# Patient Record
Sex: FEMALE | Race: WHITE | NOT HISPANIC OR LATINO | Employment: OTHER | URBAN - METROPOLITAN AREA
[De-identification: names, ages, dates, MRNs, and addresses within clinical notes are randomized per-mention and may not be internally consistent; named-entity substitution may affect disease eponyms.]

---

## 2017-02-10 ENCOUNTER — HOSPITAL ENCOUNTER (OUTPATIENT)
Dept: LAB | Facility: MEDICAL CENTER | Age: 57
End: 2017-02-10
Attending: FAMILY MEDICINE
Payer: COMMERCIAL

## 2017-02-10 LAB
T3 SERPL-MCNC: 79.6 NG/DL (ref 60–181)
T4 SERPL-MCNC: 7.8 UG/DL (ref 4–12)
TSH SERPL DL<=0.005 MIU/L-ACNC: 0.81 UIU/ML (ref 0.3–3.7)

## 2017-02-10 PROCEDURE — 36415 COLL VENOUS BLD VENIPUNCTURE: CPT

## 2017-02-10 PROCEDURE — 82542 COL CHROMOTOGRAPHY QUAL/QUAN: CPT

## 2017-02-10 PROCEDURE — 84480 ASSAY TRIIODOTHYRONINE (T3): CPT

## 2017-02-10 PROCEDURE — 84436 ASSAY OF TOTAL THYROXINE: CPT

## 2017-02-10 PROCEDURE — 84443 ASSAY THYROID STIM HORMONE: CPT

## 2017-02-17 LAB — MISCELLANEOUS LAB RESULT MISCLAB: ABNORMAL

## 2017-06-02 ENCOUNTER — OFFICE VISIT (OUTPATIENT)
Dept: URGENT CARE | Facility: CLINIC | Age: 57
End: 2017-06-02
Payer: COMMERCIAL

## 2017-06-02 ENCOUNTER — APPOINTMENT (OUTPATIENT)
Dept: RADIOLOGY | Facility: IMAGING CENTER | Age: 57
End: 2017-06-02
Attending: NURSE PRACTITIONER
Payer: COMMERCIAL

## 2017-06-02 VITALS
WEIGHT: 166 LBS | BODY MASS INDEX: 24.87 KG/M2 | TEMPERATURE: 99.8 F | RESPIRATION RATE: 18 BRPM | OXYGEN SATURATION: 95 % | DIASTOLIC BLOOD PRESSURE: 70 MMHG | SYSTOLIC BLOOD PRESSURE: 106 MMHG | HEART RATE: 102 BPM

## 2017-06-02 DIAGNOSIS — R50.9 FEVER, UNSPECIFIED FEVER CAUSE: ICD-10-CM

## 2017-06-02 DIAGNOSIS — J40 BRONCHITIS: ICD-10-CM

## 2017-06-02 DIAGNOSIS — R06.02 SOB (SHORTNESS OF BREATH): ICD-10-CM

## 2017-06-02 DIAGNOSIS — R05.9 COUGH: ICD-10-CM

## 2017-06-02 PROCEDURE — 71020 DX-CHEST-2 VIEWS: CPT | Mod: TC | Performed by: NURSE PRACTITIONER

## 2017-06-02 PROCEDURE — 99214 OFFICE O/P EST MOD 30 MIN: CPT | Performed by: NURSE PRACTITIONER

## 2017-06-02 RX ORDER — ALBUTEROL SULFATE 90 UG/1
2 AEROSOL, METERED RESPIRATORY (INHALATION) EVERY 6 HOURS PRN
Qty: 8.5 G | Refills: 0 | Status: SHIPPED | OUTPATIENT
Start: 2017-06-02 | End: 2019-12-16

## 2017-06-02 RX ORDER — AZITHROMYCIN 250 MG/1
TABLET, FILM COATED ORAL
Qty: 6 TAB | Refills: 0 | Status: SHIPPED | OUTPATIENT
Start: 2017-06-02 | End: 2018-03-07

## 2017-06-02 RX ORDER — CODEINE PHOSPHATE AND GUAIFENESIN 10; 100 MG/5ML; MG/5ML
5 SOLUTION ORAL EVERY 6 HOURS PRN
Qty: 120 ML | Refills: 0 | Status: SHIPPED | OUTPATIENT
Start: 2017-06-02 | End: 2018-03-07

## 2017-06-02 ASSESSMENT — ENCOUNTER SYMPTOMS
HEADACHES: 1
CARDIOVASCULAR NEGATIVE: 1
COUGH: 1
SPUTUM PRODUCTION: 1
PSYCHIATRIC NEGATIVE: 1
EYES NEGATIVE: 1
SHORTNESS OF BREATH: 1
FEVER: 1
CHILLS: 1
GASTROINTESTINAL NEGATIVE: 1
MYALGIAS: 1

## 2017-06-02 NOTE — PROGRESS NOTES
Subjective:      Laura Glass is a 56 y.o. female who presents with Cough    Past Medical History   Diagnosis Date   • Dyslipidemia 7/16/2012     Social History     Social History   • Marital Status:      Spouse Name: N/A   • Number of Children: N/A   • Years of Education: N/A     Occupational History   • Not on file.     Social History Main Topics   • Smoking status: Never Smoker    • Smokeless tobacco: Never Used   • Alcohol Use: Yes      Comment: occ   • Drug Use: Not on file   • Sexual Activity: Not on file     Other Topics Concern   • Not on file     Social History Narrative   • No narrative on file     Family History   Problem Relation Age of Onset   • Diabetes Mother    • Other Father      Allergies: Review of patient's allergies indicates no known allergies.    All other systems reviewed and are negative     Patient presetns with complaint of malaise, fever, SOB and productive cough x 2 days. Symptoms have worsened significantly in the last 24 hours. She has been spending a great deal of time at the hospital with her mother who has pneumonia. Patient is a non-smoker and denies history of chronic lung disease.           Cough  Associated symptoms include chills, a fever, headaches, myalgias and shortness of breath.       Review of Systems   Constitutional: Positive for fever, chills and malaise/fatigue.   HENT: Positive for congestion.    Eyes: Negative.    Respiratory: Positive for cough, sputum production and shortness of breath.    Cardiovascular: Negative.    Gastrointestinal: Negative.    Genitourinary: Negative.    Musculoskeletal: Positive for myalgias.   Skin: Negative.    Neurological: Positive for headaches.   Psychiatric/Behavioral: Negative.      All other systems reviewed and are negative      Objective:     /70 mmHg  Pulse 102  Temp(Src) 37.7 °C (99.8 °F)  Resp 18  Wt 75.297 kg (166 lb)  SpO2 95%     Physical Exam   Constitutional: She is oriented to person, place, and time.  She appears well-developed and well-nourished. No distress.   HENT:   Head: Normocephalic.   Right Ear: External ear normal.   Left Ear: External ear normal.   Nose: Nose normal.   Mouth/Throat: Oropharynx is clear and moist. No oropharyngeal exudate.   Eyes: Conjunctivae and EOM are normal. Pupils are equal, round, and reactive to light. Right eye exhibits no discharge. Left eye exhibits no discharge.   Neck: Normal range of motion. Neck supple.   Cardiovascular: Regular rhythm and normal heart sounds.       Pulmonary/Chest: Effort normal.   Breath sounds harsh   Musculoskeletal: Normal range of motion.   Neurological: She is alert and oriented to person, place, and time.   Skin: Skin is warm and dry. She is not diaphoretic.   Psychiatric: She has a normal mood and affect. Her behavior is normal.   Vitals reviewed.    XR chest:          HISTORY/REASON FOR EXAM:  Asymmetric density along right heart border on chest x-ray 7/10/2.      TECHNIQUE/EXAM DESCRIPTION:  CT scan of the chest with contrast, 7/24/2012 1:36 PM.    Thin-section helical images were obtained from the lung apices through the adrenal glands following the bolus administration of  75 mL of Omnipaque 350 nonionic contrast.    COMPARISON:  As above.    FINDINGS:  There is an ovoid uniformly fat density structure in the right anterior cardiophrenic angle with a thin soft tissue capsule, measuring overall about 4.8-cm in size.  This would account for the density seen on the recent chest x-ray.  The mediastinum and ross are otherwise unremarkable.  No pleural or pericardial effusion.  The lung parenchyma is normal.  The visualized upper abdominal structures are unremarkable except to note a 5-mm low attenuation lesion in the dome of the right hepatic lobe, consistent with a cyst.         Impression        1. Circumscribed fat containing structure in the right anterior cardiophrenic angle, accounting for the density in that location on recent chest  radiograph.  This is consistent with a prominent epicardial fat pad or lipoma.  2. No other significant findings.               Assessment/Plan:   Bronchitis  Cough   -zithroamx   -albuterol    -cheratussin PRN cough; clearly stated driving or alcohol with this medication, checked patient's  and find no evidence of narcotic misuse.   -ER precautions for respiratory distress, uncontrolled fever >100.5, weakness, lethargy.    -Follow up if symptoms persist or worsen  There are no diagnoses linked to this encounter.

## 2017-06-02 NOTE — MR AVS SNAPSHOT
Laura Glass   2017 8:15 AM   Office Visit   MRN: [id number]    Department:  [facility]   Dept Phone:  [phone]    Description:  Female : 1960   Provider:  Cathey J Hamman, A.P.N.           Reason for Visit     Cough productive cough, clear phlem, chills x 3 days      Allergies as of 2017     No Known Allergies      You were diagnosed with     Cough   [786.2.ICD-9-CM]       Fever, unspecified fever cause   [2749512]       SOB (shortness of breath)   [763147]       Bronchitis   [179107]         Vital Signs     Blood Pressure Pulse Temperature Respirations Weight Oxygen Saturation    106/70 mmHg 102 37.7 °C (99.8 °F) 18 75.297 kg (166 lb) 95%    Smoking Status                   Never Smoker            Basic Information     Date Of Birth Sex Race Ethnicity Preferred Language    1960 Female White Non- English      Problem List              ICD-10-CM Priority Class Noted - Resolved    Back pain M54.9   2012 - Present    Hot flashes R23.2   2013 - Present    Impaired fasting glucose R73.01   3/20/2015 - Present    Hyperlipidemia E78.5   2015 - Present      Health Maintenance        Date Due Completion Dates    IMM DTaP/Tdap/Td Vaccine (1 - Tdap) 9/10/1978 ---    PAP SMEAR 2016 (Prv Comp), 2011 (Done)    Override on 2013: Previously completed    Override on 2011: Done (clancy)    MAMMOGRAM 2016, 2014, 2012, 2006, 10/25/2005    COLONOSCOPY 3/10/2024 3/10/2016            Current Immunizations     Influenza TIV (IM) 2013      Below and/or attached are the medications your provider expects you to take. Review all of your home medications and newly ordered medications with your provider and/or pharmacist. Follow medication instructions as directed by your provider and/or pharmacist. Please keep your medication list with you and share with your provider. Update the information when medications  are discontinued, doses are changed, or new medications (including over-the-counter products) are added; and carry medication information at all times in the event of emergency situations     Allergies:  No Known Allergies          Medications  Valid as of: June 02, 2017 -  9:08 AM    Generic Name Brand Name Tablet Size Instructions for use    Albuterol Sulfate (Aero Soln) albuterol 108 (90 BASE) MCG/ACT Inhale 2 Puffs by mouth every 6 hours as needed for Shortness of Breath.        Albuterol Sulfate (Aero Soln) albuterol 108 (90 BASE) MCG/ACT Inhale 2 Puffs by mouth every 6 hours as needed for Shortness of Breath.        Azithromycin (Tab) ZITHROMAX 250 MG Take 2 pills by mouth on day one, take 1 pill by mouth on days 2-5        Benzonatate (Cap) TESSALON 100 MG Take 1 Cap by mouth 3 times a day as needed for Cough. DO NOT CHEW CAPSULE        Guaifenesin-Codeine (Solution) ROBITUSSIN -10 mg/5mL Take 5 mL by mouth every 6 hours as needed.        Pravastatin Sodium (Tab) PRAVACHOL 40 MG Take 1 Tab by mouth every day. TAKE 1 TAB BY MOUTH EVERY DAY.        Thyroid   Take  by mouth.        .                 Medicines prescribed today were sent to:     [facility][id number] [address]    [address] [facility] [address]    Phone: [phone] Fax: [phone]    Open 24 Hours?: No      Medication refill instructions:       If your prescription bottle indicates you have medication refills left, it is not necessary to call your provider’s office. Please contact your pharmacy and they will refill your medication.    If your prescription bottle indicates you do not have any refills left, you may request refills at any time through one of the following ways: The online AlertMe system (except Urgent Care), by calling your provider’s office, or by asking your pharmacy to contact your provider’s office with a refill request. Medication refills are processed only during regular business hours and may not  be available until the next business day. Your provider may request additional information or to have a follow-up visit with you prior to refilling your medication.   *Please Note: Medication refills are assigned a new Rx number when refilled electronically. Your pharmacy may indicate that no refills were authorized even though a new prescription for the same medication is available at the pharmacy. Please request the medicine by name with the pharmacy before contacting your provider for a refill.        Your To Do List     Future Labs/Procedures Complete By Expires    DX-CHEST-2 VIEWS  As directed 6/2/2018         [facility] Access Code: Activation code not generated  Current [facility] Status: Active

## 2017-07-21 ENCOUNTER — HOSPITAL ENCOUNTER (OUTPATIENT)
Dept: RADIOLOGY | Facility: MEDICAL CENTER | Age: 57
End: 2017-07-21
Attending: FAMILY MEDICINE
Payer: COMMERCIAL

## 2017-07-21 DIAGNOSIS — Z12.31 SCREENING MAMMOGRAM, ENCOUNTER FOR: ICD-10-CM

## 2017-07-21 PROCEDURE — 77063 BREAST TOMOSYNTHESIS BI: CPT

## 2017-08-16 ENCOUNTER — HOSPITAL ENCOUNTER (OUTPATIENT)
Dept: LAB | Facility: MEDICAL CENTER | Age: 57
End: 2017-08-16
Attending: FAMILY MEDICINE
Payer: COMMERCIAL

## 2017-08-16 LAB
25(OH)D3 SERPL-MCNC: 41 NG/ML (ref 30–100)
ALBUMIN SERPL BCP-MCNC: 4 G/DL (ref 3.2–4.9)
ALBUMIN/GLOB SERPL: 1.4 G/DL
ALP SERPL-CCNC: 55 U/L (ref 30–99)
ALT SERPL-CCNC: 22 U/L (ref 2–50)
ANION GAP SERPL CALC-SCNC: 8 MMOL/L (ref 0–11.9)
AST SERPL-CCNC: 19 U/L (ref 12–45)
BASOPHILS # BLD AUTO: 0.6 % (ref 0–1.8)
BASOPHILS # BLD: 0.03 K/UL (ref 0–0.12)
BILIRUB SERPL-MCNC: 0.5 MG/DL (ref 0.1–1.5)
BUN SERPL-MCNC: 17 MG/DL (ref 8–22)
CALCIUM SERPL-MCNC: 9.4 MG/DL (ref 8.5–10.5)
CHLORIDE SERPL-SCNC: 106 MMOL/L (ref 96–112)
CHOLEST SERPL-MCNC: 243 MG/DL (ref 100–199)
CK SERPL-CCNC: 138 U/L (ref 0–154)
CO2 SERPL-SCNC: 25 MMOL/L (ref 20–33)
CREAT SERPL-MCNC: 0.68 MG/DL (ref 0.5–1.4)
EOSINOPHIL # BLD AUTO: 0.14 K/UL (ref 0–0.51)
EOSINOPHIL NFR BLD: 2.8 % (ref 0–6.9)
ERYTHROCYTE [DISTWIDTH] IN BLOOD BY AUTOMATED COUNT: 45.7 FL (ref 35.9–50)
GFR SERPL CREATININE-BSD FRML MDRD: >60 ML/MIN/1.73 M 2
GLOBULIN SER CALC-MCNC: 2.9 G/DL (ref 1.9–3.5)
GLUCOSE SERPL-MCNC: 97 MG/DL (ref 65–99)
HCT VFR BLD AUTO: 43 % (ref 37–47)
HDLC SERPL-MCNC: 64 MG/DL
HGB BLD-MCNC: 14.2 G/DL (ref 12–16)
IMM GRANULOCYTES # BLD AUTO: 0.01 K/UL (ref 0–0.11)
IMM GRANULOCYTES NFR BLD AUTO: 0.2 % (ref 0–0.9)
LDLC SERPL CALC-MCNC: 150 MG/DL
LYMPHOCYTES # BLD AUTO: 2.55 K/UL (ref 1–4.8)
LYMPHOCYTES NFR BLD: 50.3 % (ref 22–41)
MCH RBC QN AUTO: 30.2 PG (ref 27–33)
MCHC RBC AUTO-ENTMCNC: 33 G/DL (ref 33.6–35)
MCV RBC AUTO: 91.5 FL (ref 81.4–97.8)
MONOCYTES # BLD AUTO: 0.43 K/UL (ref 0–0.85)
MONOCYTES NFR BLD AUTO: 8.5 % (ref 0–13.4)
NEUTROPHILS # BLD AUTO: 1.91 K/UL (ref 2–7.15)
NEUTROPHILS NFR BLD: 37.6 % (ref 44–72)
NRBC # BLD AUTO: 0 K/UL
NRBC BLD AUTO-RTO: 0 /100 WBC
PLATELET # BLD AUTO: 248 K/UL (ref 164–446)
PMV BLD AUTO: 10 FL (ref 9–12.9)
POTASSIUM SERPL-SCNC: 3.9 MMOL/L (ref 3.6–5.5)
PROT SERPL-MCNC: 6.9 G/DL (ref 6–8.2)
RBC # BLD AUTO: 4.7 M/UL (ref 4.2–5.4)
SODIUM SERPL-SCNC: 139 MMOL/L (ref 135–145)
T4 SERPL-MCNC: 7.9 UG/DL (ref 4–12)
TRIGL SERPL-MCNC: 146 MG/DL (ref 0–149)
TSH SERPL DL<=0.005 MIU/L-ACNC: 0.03 UIU/ML (ref 0.3–3.7)
WBC # BLD AUTO: 5.1 K/UL (ref 4.8–10.8)

## 2017-08-16 PROCEDURE — 80053 COMPREHEN METABOLIC PANEL: CPT

## 2017-08-16 PROCEDURE — 82550 ASSAY OF CK (CPK): CPT

## 2017-08-16 PROCEDURE — 82306 VITAMIN D 25 HYDROXY: CPT

## 2017-08-16 PROCEDURE — 84479 ASSAY OF THYROID (T3 OR T4): CPT

## 2017-08-16 PROCEDURE — 36415 COLL VENOUS BLD VENIPUNCTURE: CPT

## 2017-08-16 PROCEDURE — 80061 LIPID PANEL: CPT

## 2017-08-16 PROCEDURE — 85025 COMPLETE CBC W/AUTO DIFF WBC: CPT

## 2017-08-16 PROCEDURE — 84436 ASSAY OF TOTAL THYROXINE: CPT

## 2017-08-16 PROCEDURE — 84443 ASSAY THYROID STIM HORMONE: CPT

## 2017-08-18 LAB — T3RU NFR SERPL: 38 % (ref 28–41)

## 2017-10-13 ENCOUNTER — HOSPITAL ENCOUNTER (OUTPATIENT)
Dept: LAB | Facility: MEDICAL CENTER | Age: 57
End: 2017-10-13
Attending: FAMILY MEDICINE
Payer: COMMERCIAL

## 2017-10-13 LAB
T4 SERPL-MCNC: 6.1 UG/DL (ref 4–12)
TSH SERPL DL<=0.005 MIU/L-ACNC: 1.34 UIU/ML (ref 0.3–3.7)

## 2017-10-13 PROCEDURE — 36415 COLL VENOUS BLD VENIPUNCTURE: CPT

## 2017-10-13 PROCEDURE — 84479 ASSAY OF THYROID (T3 OR T4): CPT

## 2017-10-13 PROCEDURE — 84436 ASSAY OF TOTAL THYROXINE: CPT

## 2017-10-13 PROCEDURE — 84443 ASSAY THYROID STIM HORMONE: CPT

## 2017-10-15 LAB — T3RU NFR SERPL: 34 % (ref 28–41)

## 2018-03-07 ENCOUNTER — OFFICE VISIT (OUTPATIENT)
Dept: MEDICAL GROUP | Facility: MEDICAL CENTER | Age: 58
End: 2018-03-07
Payer: COMMERCIAL

## 2018-03-07 VITALS
HEART RATE: 64 BPM | OXYGEN SATURATION: 95 % | SYSTOLIC BLOOD PRESSURE: 126 MMHG | TEMPERATURE: 98 F | DIASTOLIC BLOOD PRESSURE: 72 MMHG | WEIGHT: 169 LBS | BODY MASS INDEX: 25.61 KG/M2 | HEIGHT: 68 IN

## 2018-03-07 DIAGNOSIS — Z13.220 ENCOUNTER FOR LIPID SCREENING FOR CARDIOVASCULAR DISEASE: ICD-10-CM

## 2018-03-07 DIAGNOSIS — Z13.1 DIABETES MELLITUS SCREENING: ICD-10-CM

## 2018-03-07 DIAGNOSIS — R23.2 HOT FLASHES: ICD-10-CM

## 2018-03-07 DIAGNOSIS — Z76.89 ENCOUNTER TO ESTABLISH CARE WITH NEW DOCTOR: ICD-10-CM

## 2018-03-07 DIAGNOSIS — E03.9 ACQUIRED HYPOTHYROIDISM: ICD-10-CM

## 2018-03-07 DIAGNOSIS — Z00.00 ROUTINE GENERAL MEDICAL EXAMINATION AT A HEALTH CARE FACILITY: ICD-10-CM

## 2018-03-07 DIAGNOSIS — E78.5 HYPERLIPIDEMIA, UNSPECIFIED HYPERLIPIDEMIA TYPE: ICD-10-CM

## 2018-03-07 DIAGNOSIS — Z13.6 ENCOUNTER FOR LIPID SCREENING FOR CARDIOVASCULAR DISEASE: ICD-10-CM

## 2018-03-07 PROCEDURE — 99214 OFFICE O/P EST MOD 30 MIN: CPT | Performed by: FAMILY MEDICINE

## 2018-03-07 RX ORDER — LEVOTHYROXINE SODIUM 0.05 MG/1
TABLET ORAL
COMMUNITY
Start: 2018-01-26 | End: 2018-05-17 | Stop reason: SDUPTHER

## 2018-03-11 NOTE — ASSESSMENT & PLAN NOTE
Patient and I discussed recent labs (see below; pure hypercholesterolemia) and that ASCVD risk is increased based on most recent lipid panel, current blood pressure (non-hypertensive, without medication), diabetes status (non-diabetic), and smoking status (non-smoker).    Patient and I then discussed necessary dietary changes to make to address dyslipidemia.  Patient verbalized understanding.    ROS is NEGATIVE for dizziness, generalized weakness/fatigue, vision/hearing changes, jaw pain/paresthesias, BUE pain/paresthesias/numbness/weakness, chest pain/pressure, palpitations, dyspnea, RUQ abdominal pain, oliguria/anuria, BLE edema.    Lab Results   Component Value Date/Time    CHOLSTRLTOT 243 (H) 08/16/2017 07:40 AM     (H) 08/16/2017 07:40 AM    HDL 64 08/16/2017 07:40 AM    TRIGLYCERIDE 146 08/16/2017 07:40 AM

## 2018-03-11 NOTE — PROGRESS NOTES
Subjective:   Chief Complaint/History of Present Illness:  Laura Glass is a 57 y.o. female established patient who presents today to establish primary medical care with me, also to discuss hypothyroid, HLD, and hot flashes:    Acquired hypothyroidism  Chronic, stable, well-controlled.  Patient is taking her Levothyroxine as indicated.  Review of labs reveals that patient had thyroid labs within normal limits in 10/2017.    ROS is NEGATIVE for: cold or heat intolerance, anxiety/depression, chest pain/pressure, palpitations, hair thickening/coarsening/falling out/thinning, skin changes, diarrhea/constipation, unexpected weight change.    Hyperlipidemia  Patient and I discussed recent labs (see below; pure hypercholesterolemia) and that ASCVD risk is increased based on most recent lipid panel, current blood pressure (non-hypertensive, without medication), diabetes status (non-diabetic), and smoking status (non-smoker).    Patient and I then discussed necessary dietary changes to make to address dyslipidemia.  Patient verbalized understanding.    ROS is NEGATIVE for dizziness, generalized weakness/fatigue, vision/hearing changes, jaw pain/paresthesias, BUE pain/paresthesias/numbness/weakness, chest pain/pressure, palpitations, dyspnea, RUQ abdominal pain, oliguria/anuria, BLE edema.    Lab Results   Component Value Date/Time    CHOLSTRLTOT 243 (H) 08/16/2017 07:40 AM     (H) 08/16/2017 07:40 AM    HDL 64 08/16/2017 07:40 AM    TRIGLYCERIDE 146 08/16/2017 07:40 AM       Hot flashes  Chronic, uncontrolled, but improving.  Patient and I discussed her treatment options, that she can take estrogens vs. OTC supplements (e.g. Black cohosh).  Patient was also informed of the possible complications from estrogen use (thromboembolic events, heart disease, migraines).  Patient would like to continue off estrogens.          Patient Active Problem List    Diagnosis Date Noted   • Acquired hypothyroidism 03/07/2018   •  "Hyperlipidemia 11/20/2015   • Hot flashes 09/25/2013       Additional History:   Allergies:    Patient has no known allergies.     Current Medications:     Current Outpatient Prescriptions   Medication Sig Dispense Refill   • levothyroxine (SYNTHROID) 50 MCG Tab      • albuterol 108 (90 BASE) MCG/ACT Aero Soln inhalation aerosol Inhale 2 Puffs by mouth every 6 hours as needed for Shortness of Breath. 8.5 g 0   • pravastatin (PRAVACHOL) 40 MG tablet Take 1 Tab by mouth every day. TAKE 1 TAB BY MOUTH EVERY DAY. 90 Tab 4   • albuterol (VENTOLIN OR PROVENTIL) 108 (90 BASE) MCG/ACT AERS Inhale 2 Puffs by mouth every 6 hours as needed for Shortness of Breath. 8.5 g 3     No current facility-administered medications for this visit.         Social History:     Social History   Substance Use Topics   • Smoking status: Never Smoker   • Smokeless tobacco: Never Used   • Alcohol use 8.4 oz/week     14 Shots of liquor per week       ROS:     - NOTE: All other systems reviewed and are negative, except as in HPI.     Objective:   Physical Exam:    Vitals: Blood pressure 126/72, pulse 64, temperature 36.7 °C (98 °F), height 1.727 m (5' 8\"), weight 76.7 kg (169 lb), SpO2 95 %.   BMI: Body mass index is 25.7 kg/m².   General/Constitutional: Vitals as above, Well nourished, well developed female in no acute distress   Head/Eyes: Head is grossly normal & atraumatic, bilateral conjunctivae clear and not injected, bilateral EOMI, bilateral PERRL   ENT: Bilateral external ears grossly normal in appearance, Hearing grossly intact, External nares normal in appearance and without discharge/bleeding   Respiratory: No respiratory distress, bilateral lungs are clear to ausculation in all lung fields (anterior/lateral/posterior), no wheezing/rhonchi/rales   Cardiovascular: Regular rate and rhythm without murmur/gallops/rubs, distal pulses are intact and equal bilaterally (radial, posterior tibial), no bilateral lower extremity edema   MSK: Gait " grossly normal & not antalgic   Integumentary: No apparent rashes   Psych: Judgment grossly appropriate, no apparent depression/anxiety    Health Maintenance:     - I have requested previous records (Dr. Elia Faye), and will update accordingly.    Imaging/Labs:     - I have requested previous records (Dr. Elia Faye), and will update accordingly.    Assessment and Plan:   1. Encounter to establish care with new doctor  Patient transferring primary medical care to me from Dr. Elia Faye.    2. Acquired hypothyroidism  Chronic, stable, well-controlled.  Continue Levothyroxine at present dosage.    3. Hyperlipidemia, unspecified hyperlipidemia type  Chronic, uncontrolled.  Patient and I discussed the importance of lifestyle changes, with particular emphasis on plant-based nutrition (for the purposes of weight loss, general health, HLD), as well as cardiovascular exercise, proper sleep, and stress management.  Patient verbalized understanding.    4. Hot flashes  Chronic, uncontrolled, declines estrogen supplementation at present time.  Patient given handout from [facility] re: Black Cohosh.  Patient to consider taking this supplement.    5. Routine general medical examination at a health care facility  6. Diabetes mellitus screening  7. Encounter for lipid screening for cardiovascular disease  Unknown control of metabolic health. Labs as below to evaluate.   - HEMOGLOBIN A1C; Future   - LIPID PROFILE; Future   - COMP METABOLIC PANEL; Future        RTC: in 3months for discussion of hot flashes, follow-up on labs.    PLEASE NOTE: This dictation was created using voice recognition software. I have made every reasonable attempt to correct obvious errors, but I expect that there are errors of grammar and possibly content that I did not discover before finalizing the note.

## 2018-03-11 NOTE — ASSESSMENT & PLAN NOTE
Chronic, uncontrolled, but improving.  Patient and I discussed her treatment options, that she can take estrogens vs. OTC supplements (e.g. Black cohosh).  Patient was also informed of the possible complications from estrogen use (thromboembolic events, heart disease, migraines).  Patient would like to continue off estrogens.

## 2018-03-11 NOTE — ASSESSMENT & PLAN NOTE
Chronic, stable, well-controlled.  Patient is taking her Levothyroxine as indicated.  Review of labs reveals that patient had thyroid labs within normal limits in 10/2017.    ROS is NEGATIVE for: cold or heat intolerance, anxiety/depression, chest pain/pressure, palpitations, hair thickening/coarsening/falling out/thinning, skin changes, diarrhea/constipation, unexpected weight change.

## 2018-04-20 ENCOUNTER — NON-PROVIDER VISIT (OUTPATIENT)
Dept: MEDICAL GROUP | Facility: MEDICAL CENTER | Age: 58
End: 2018-04-20
Payer: COMMERCIAL

## 2018-04-20 DIAGNOSIS — Z23 NEED FOR TDAP VACCINATION: ICD-10-CM

## 2018-04-20 PROCEDURE — 90715 TDAP VACCINE 7 YRS/> IM: CPT | Performed by: FAMILY MEDICINE

## 2018-04-20 PROCEDURE — 90471 IMMUNIZATION ADMIN: CPT | Performed by: FAMILY MEDICINE

## 2018-04-20 NOTE — NON-PROVIDER
"Laura Glass is a 57 y.o. female here for a non-provider visit for:   TDAP    Reason for immunization: Need becuase daughter is having a baby  Immunization records indicate need for vaccine: Yes, confirmed with Epic  Minimum interval has been met for this vaccine: Yes  ABN completed: Not Indicated    Order and dose verified by: IAN  VIS Dated  02/24/2015 was given to patient: Yes  All IAC Questionnaire questions were answered \"No.\"    Patient tolerated injection and no adverse effects were observed or reported: Yes    Pt scheduled for next dose in series: Not Indicated    "

## 2018-05-17 ENCOUNTER — PATIENT MESSAGE (OUTPATIENT)
Dept: MEDICAL GROUP | Facility: MEDICAL CENTER | Age: 58
End: 2018-05-17

## 2018-05-17 DIAGNOSIS — E03.9 ACQUIRED HYPOTHYROIDISM: ICD-10-CM

## 2018-05-18 RX ORDER — LEVOTHYROXINE SODIUM 0.05 MG/1
50 TABLET ORAL
Qty: 90 TAB | Refills: 1 | Status: SHIPPED | OUTPATIENT
Start: 2018-05-18 | End: 2018-12-08 | Stop reason: SDUPTHER

## 2018-05-31 ENCOUNTER — HOSPITAL ENCOUNTER (OUTPATIENT)
Dept: LAB | Facility: MEDICAL CENTER | Age: 58
End: 2018-05-31
Attending: FAMILY MEDICINE
Payer: COMMERCIAL

## 2018-05-31 DIAGNOSIS — Z13.6 ENCOUNTER FOR LIPID SCREENING FOR CARDIOVASCULAR DISEASE: ICD-10-CM

## 2018-05-31 DIAGNOSIS — Z13.220 ENCOUNTER FOR LIPID SCREENING FOR CARDIOVASCULAR DISEASE: ICD-10-CM

## 2018-05-31 DIAGNOSIS — Z13.1 DIABETES MELLITUS SCREENING: ICD-10-CM

## 2018-05-31 DIAGNOSIS — Z00.00 ROUTINE GENERAL MEDICAL EXAMINATION AT A HEALTH CARE FACILITY: ICD-10-CM

## 2018-05-31 LAB
ALBUMIN SERPL BCP-MCNC: 4.2 G/DL (ref 3.2–4.9)
ALBUMIN/GLOB SERPL: 1.5 G/DL
ALP SERPL-CCNC: 47 U/L (ref 30–99)
ALT SERPL-CCNC: 24 U/L (ref 2–50)
ANION GAP SERPL CALC-SCNC: 10 MMOL/L (ref 0–11.9)
AST SERPL-CCNC: 20 U/L (ref 12–45)
BILIRUB SERPL-MCNC: 0.5 MG/DL (ref 0.1–1.5)
BUN SERPL-MCNC: 18 MG/DL (ref 8–22)
CALCIUM SERPL-MCNC: 9.5 MG/DL (ref 8.5–10.5)
CHLORIDE SERPL-SCNC: 103 MMOL/L (ref 96–112)
CHOLEST SERPL-MCNC: 251 MG/DL (ref 100–199)
CO2 SERPL-SCNC: 26 MMOL/L (ref 20–33)
CREAT SERPL-MCNC: 0.77 MG/DL (ref 0.5–1.4)
EST. AVERAGE GLUCOSE BLD GHB EST-MCNC: 114 MG/DL
GLOBULIN SER CALC-MCNC: 2.8 G/DL (ref 1.9–3.5)
GLUCOSE SERPL-MCNC: 97 MG/DL (ref 65–99)
HBA1C MFR BLD: 5.6 % (ref 0–5.6)
HDLC SERPL-MCNC: 69 MG/DL
LDLC SERPL CALC-MCNC: 146 MG/DL
POTASSIUM SERPL-SCNC: 3.9 MMOL/L (ref 3.6–5.5)
PROT SERPL-MCNC: 7 G/DL (ref 6–8.2)
SODIUM SERPL-SCNC: 139 MMOL/L (ref 135–145)
TRIGL SERPL-MCNC: 180 MG/DL (ref 0–149)

## 2018-05-31 PROCEDURE — 80053 COMPREHEN METABOLIC PANEL: CPT

## 2018-05-31 PROCEDURE — 80061 LIPID PANEL: CPT

## 2018-05-31 PROCEDURE — 36415 COLL VENOUS BLD VENIPUNCTURE: CPT

## 2018-05-31 PROCEDURE — 83036 HEMOGLOBIN GLYCOSYLATED A1C: CPT

## 2018-06-01 ENCOUNTER — TELEPHONE (OUTPATIENT)
Dept: MEDICAL GROUP | Facility: MEDICAL CENTER | Age: 58
End: 2018-06-01

## 2018-06-01 NOTE — TELEPHONE ENCOUNTER
ESTABLISHED PATIENT PRE-VISIT PLANNING     Note: Patient will not be contacted if there is no indication to call.     1.  Reviewed notes from the last few office visits within the medical group: Yes 03/07/2018    2.  If any orders were placed at last visit or intended to be done for this visit (i.e. 6 mos follow-up), do we have Results/Consult Notes?        •  Labs - Labs ordered, completed on 05/31/2018 and results are in chart.   Note: If patient appointment is for lab review and patient did not complete labs, check with provider if OK to reschedule patient until labs completed.       •  Imaging - Imaging was not ordered at last office visit.       •  Referrals - No referrals were ordered at last office visit.    3. Is this appointment scheduled as a Hospital Follow-Up? No    4.  Immunizations were updated in [employer] using WebIZ?: Yes       •  Web Iz Recommendations: Patient is up to date on all vaccines    5.  Patient is due for the following Health Maintenance Topics:   Health Maintenance Due   Topic Date Due   • PAP SMEAR  06/20/2016         6.  MDX printed for Provider? NO    7.  Patient was informed to arrive 15 min prior to their scheduled appointment and bring in their medication bottles.      Left reminder of vm.

## 2018-06-07 ENCOUNTER — OFFICE VISIT (OUTPATIENT)
Dept: MEDICAL GROUP | Facility: MEDICAL CENTER | Age: 58
End: 2018-06-07
Payer: COMMERCIAL

## 2018-06-07 VITALS
OXYGEN SATURATION: 97 % | BODY MASS INDEX: 24.71 KG/M2 | TEMPERATURE: 98.2 F | HEART RATE: 71 BPM | SYSTOLIC BLOOD PRESSURE: 106 MMHG | WEIGHT: 163 LBS | DIASTOLIC BLOOD PRESSURE: 68 MMHG | HEIGHT: 68 IN

## 2018-06-07 DIAGNOSIS — E78.2 MIXED DYSLIPIDEMIA: ICD-10-CM

## 2018-06-07 DIAGNOSIS — E03.9 ACQUIRED HYPOTHYROIDISM: ICD-10-CM

## 2018-06-07 DIAGNOSIS — R23.2 HOT FLASHES: ICD-10-CM

## 2018-06-07 PROCEDURE — 99214 OFFICE O/P EST MOD 30 MIN: CPT | Performed by: FAMILY MEDICINE

## 2018-06-07 RX ORDER — PRAVASTATIN SODIUM 40 MG
40 TABLET ORAL
Qty: 90 TAB | Refills: 1 | Status: SHIPPED | OUTPATIENT
Start: 2018-06-07 | End: 2018-10-08 | Stop reason: SDUPTHER

## 2018-06-07 RX ORDER — CYCLOSPORINE 0.5 MG/ML
EMULSION OPHTHALMIC
Refills: 3 | COMMUNITY
Start: 2018-05-25 | End: 2019-12-16

## 2018-06-07 ASSESSMENT — PATIENT HEALTH QUESTIONNAIRE - PHQ9: CLINICAL INTERPRETATION OF PHQ2 SCORE: 0

## 2018-06-07 NOTE — PATIENT INSTRUCTIONS
"Dr. Phelps's tips for \"Lifestyle Medicine:\"     Check out the talk/documentary on \"How not to die\" by Dr. Roman Knott (on his website nutritionfacts.org, he also authored a book with this title).       1) Make SMART lifestyle changes: Specific, Measurable, Attainable, Relevant, Time-sensitive.  The lifestyle changes that you need to make are with regards to: nutrition, cardiovascular exercise, sleep, stress management.  Make these changes every 2 weeks, revisiting the previous goals and perhaps revising them and/or setting new ones.       2) Nutrition: Make as many changes as you can to increase the amount of whole-foods (not Whole Foods, necessarily!  ;-)), plant-based diet as possible:   A) Books: Eat to Live (Dr. Junior Snyder), The Spectrum (Dr. Jourdan Skelton), The Starch Solution (Dr. Blade Kinsey)      B) Documentaries (can usually be found on [employer]): [place].  Fat, Sick, and Nearly Dead.  Fed Up.           3) Cardiovascular Exercise: The center for disease control recommends a minimum of 150 minutes per week of moderate intensity cardiovascular exercise for weight maintenance and cardiovascular health.  Set this as your initial goal, with at least 30 minutes per session. Types of exercise can include 30 minutes of elliptical, 30 minutes of decently fast jog, 30 minutes of swimming, 30 minutes of heavy gardening (lifting big bags of fertilizer, digging deep holes/ditches).  He can cut down the minute requirements to half, by doing higher intensity sports such as a game of tennis, or soccer.  He notes the library and check out with they have for home exercise programs, as well.       4) Sleep:    A) Goal: Obtain a minimum of 7-8hours of continuous, uninterrupted, restful sleep per night.    B) Tips for Sleep Hygiene:    I) Go to bed and wake up at consistent times whether work/school day or not.     II) Keep room dark, quiet, and comfortable.  Increase exposure to sunlight during awake times and " avoid bright lights (especially anything with a backlight) at least the last 1-2hours before going to sleep.     III) Don't nap.     IV) Avoid stimulant or caffeine use more than 4 hours after wake time.        5) Stress Management: You cannot change the stresses of life dizziness necessarily, but you can change how he responds of them. One good way to manage stress is to write things down in order to help you process how to approach things in general or specifically. Another good way is to talk it out with someone you trusts, specifically your significant other or good friend. A definite great way to deal with stress is to have cardiovascular exercise!

## 2018-06-07 NOTE — LETTER
[facility]  Marquis Phelps M.D.  [address]  [place] [address]  Fax: [phone]   Authorization for Release/Disclosure of   Protected Health Information   Name: ISAAC LEAHY : 1960 SSN: [id number]   Address: [address] [place] [address]  [place] [address] Phone:    [phone] (home) [phone] (work)   I authorize the entity listed below to release/disclose the PHI below to:   [facility]/Marquis Phelps M.D. and Marquis Phelps M.D.   Provider or Entity Name:  Dr. Eric Lee   Address   City, State, Zip   Phone:      Fax:  [phone]   Reason for request: continuity of care   Information to be released:    [  ] LAST COLONOSCOPY,  including any PATH REPORT and follow-up  [  ] LAST FIT/COLOGUARD RESULT [  ] LAST DEXA  [  ] LAST MAMMOGRAM  [  ] LAST PAP  [  ] LAST LABS [  ] RETINA EXAM REPORT  [  ] IMMUNIZATION RECORDS  [  ] Release all info      [  ] Check here and initial the line next to each item to release ALL health information INCLUDING  _____ Care and treatment for drug and / or alcohol abuse  _____ HIV testing, infection status, or AIDS  _____ Genetic Testing    DATES OF SERVICE OR TIME PERIOD TO BE DISCLOSED: _____________  I understand and acknowledge that:  * This Authorization may be revoked at any time by you in writing, except if your health information has already been used or disclosed.  * Your health information that will be used or disclosed as a result of you signing this authorization could be re-disclosed by the recipient. If this occurs, your re-disclosed health information may no longer be protected by State or Federal laws.  * You may refuse to sign this Authorization. Your refusal will not affect your ability to obtain treatment.  * This Authorization becomes effective upon signing and will  on (date) __________.      If no date is indicated, this Authorization will  one (1) year from the signature date.    Name:  Laura Glass    Signature:   Date:     6/7/2018       PLEASE FAX REQUESTED RECORDS BACK TO: [phone]

## 2018-06-07 NOTE — ASSESSMENT & PLAN NOTE
Patient and I discussed recent labs (see below; mixed dyslipidemia, uncontrolled) and that ASCVD risk is increased based on most recent lipid panel, current blood pressure (non-hypertensive), diabetes status (non-diabetic), and smoking status (non-smoker).    Patient and I then discussed necessary dietary changes to make to address dyslipidemia.  Patient is currently taking cholesterol lowering medication.  Patient verbalized understanding.    ROS is NEGATIVE for dizziness, generalized weakness/fatigue, vision/hearing changes, jaw pain/paresthesias, BUE pain/paresthesias/numbness/weakness, chest pain/pressure, palpitations, dyspnea, RUQ abdominal pain, oliguria/anuria, BLE edema.    Lab Results   Component Value Date/Time    CHOLSTRLTOT 251 (H) 05/31/2018 09:19 AM     (H) 05/31/2018 09:19 AM    HDL 69 05/31/2018 09:19 AM    TRIGLYCERIDE 180 (H) 05/31/2018 09:19 AM

## 2018-06-10 NOTE — PROGRESS NOTES
Subjective:   Chief Complaint/History of Present Illness:  Laura Glass is a 57 y.o. female established patient who presents today to discuss medical problems as listed below    Diagnoses of Mixed dyslipidemia, Hot flashes, and Acquired hypothyroidism were pertinent to this visit.    Mixed dyslipidemia  Patient and I discussed recent labs (see below; mixed dyslipidemia, uncontrolled) and that ASCVD risk is increased based on most recent lipid panel, current blood pressure (non-hypertensive), diabetes status (non-diabetic), and smoking status (non-smoker).    Patient and I then discussed necessary dietary changes to make to address dyslipidemia.  Patient is currently taking cholesterol lowering medication.  Patient verbalized understanding.    ROS is NEGATIVE for dizziness, generalized weakness/fatigue, vision/hearing changes, jaw pain/paresthesias, BUE pain/paresthesias/numbness/weakness, chest pain/pressure, palpitations, dyspnea, RUQ abdominal pain, oliguria/anuria, BLE edema.    Lab Results   Component Value Date/Time    CHOLSTRLTOT 251 (H) 05/31/2018 09:19 AM     (H) 05/31/2018 09:19 AM    HDL 69 05/31/2018 09:19 AM    TRIGLYCERIDE 180 (H) 05/31/2018 09:19 AM       Hot flashes  Chronic, uncontrolled, improving with over-the-counter supplements.    Acquired hypothyroidism  Chronic, stable, well-controlled.  Patient is taking medication as directed.    ROS is NEGATIVE for: cold or heat intolerance, anxiety/depression, chest pain/pressure, palpitations, hair thickening/coarsening/falling out/thinning, skin changes, diarrhea/constipation, unexpected weight change.      Patient Active Problem List    Diagnosis Date Noted   • Acquired hypothyroidism 03/07/2018   • Mixed dyslipidemia 11/20/2015   • Hot flashes 09/25/2013       Additional History:   Allergies:    Patient has no known allergies.     Current Medications:     Current Outpatient Prescriptions   Medication Sig Dispense Refill   • pravastatin  "(PRAVACHOL) 40 MG tablet Take 1 Tab by mouth every day. TAKE 1 TAB BY MOUTH EVERY DAY. 90 Tab 1   • RESTASIS 0.05 % ophthalmic emulsion INSTILL 1 DROP INTO BOTH EYES TWICE A DAY AS DIRECTED  3   • levothyroxine (SYNTHROID) 50 MCG Tab Take 1 Tab by mouth Every morning on an empty stomach. 90 Tab 1   • albuterol 108 (90 BASE) MCG/ACT Aero Soln inhalation aerosol Inhale 2 Puffs by mouth every 6 hours as needed for Shortness of Breath. 8.5 g 0   • albuterol (VENTOLIN OR PROVENTIL) 108 (90 BASE) MCG/ACT AERS Inhale 2 Puffs by mouth every 6 hours as needed for Shortness of Breath. 8.5 g 3     No current facility-administered medications for this visit.         Social History:     Social History   Substance Use Topics   • Smoking status: Never Smoker   • Smokeless tobacco: Never Used   • Alcohol use 8.4 oz/week     14 Shots of liquor per week       ROS:     - NOTE: All other systems reviewed and are negative, except as in HPI.     Objective:   Physical Exam:    Vitals: Blood pressure 106/68, pulse 71, temperature 36.8 °C (98.2 °F), height 1.727 m (5' 8\"), weight 73.9 kg (163 lb), SpO2 97 %.   BMI: Body mass index is 24.78 kg/m².   General/Constitutional: Vitals as above, Well nourished, well developed female in no acute distress   Head/Eyes: Head is grossly normal & atraumatic, bilateral conjunctivae clear and not injected, bilateral EOMI, bilateral PERRL   ENT: Bilateral external ears grossly normal in appearance, Hearing grossly intact, External nares normal in appearance and without discharge/bleeding   Respiratory: No respiratory distress, bilateral lungs are clear to ausculation in all lung fields (anterior/lateral/posterior), no wheezing/rhonchi/rales   Cardiovascular: Regular rate and rhythm without murmur/gallops/rubs, distal pulses are intact and equal bilaterally (radial, posterior tibial), no bilateral lower extremity edema   MSK: Gait grossly normal & not antalgic   Integumentary: No apparent rashes   Psych: " Judgment grossly appropriate, no apparent depression/anxiety    Health Maintenance:     - Reviewed and found to be up-to-date apart from pap smear.  Requesting previous records.    Imaging/Labs:     - 05/31/18 -- Mixed DLD, o/w other labs WNL (CMP, A1c)    Assessment and Plan:   1. Mixed dyslipidemia  Chronic, uncontrolled.  Continue statin + proceed with making further lifestyle changes.  Patient and I discussed the importance of lifestyle changes, with particular emphasis on plant-based nutrition (for the purposes of weight loss, general health, a), as well as cardiovascular exercise, proper sleep, and stress management.  This discussion is briefly summarized in the patient instruction section below.  Patient verbalized understanding.   - LIPID PROFILE; Future   - pravastatin (PRAVACHOL) 40 MG tablet; Take 1 Tab by mouth every day. TAKE 1 TAB BY MOUTH EVERY DAY.  Dispense: 90 Tab; Refill: 1    2. Hot flashes  Chronic, uncontrolled, improving.  Patient to continue OTC supplements.    3. Acquired hypothyroidism  Chronic, stable, well-controlled.  Continue taking medication as directed.   - TSH WITH REFLEX TO FT4; Future        RTC: in 6months for Annual Medical Exam.    PLEASE NOTE: This dictation was created using voice recognition software. I have made every reasonable attempt to correct obvious errors, but I expect that there are errors of grammar and possibly content that I did not discover before finalizing the note.

## 2018-06-10 NOTE — ASSESSMENT & PLAN NOTE
Chronic, stable, well-controlled.  Patient is taking medication as directed.    ROS is NEGATIVE for: cold or heat intolerance, anxiety/depression, chest pain/pressure, palpitations, hair thickening/coarsening/falling out/thinning, skin changes, diarrhea/constipation, unexpected weight change.

## 2018-10-04 ENCOUNTER — TELEPHONE (OUTPATIENT)
Dept: MEDICAL GROUP | Facility: MEDICAL CENTER | Age: 58
End: 2018-10-04

## 2018-10-04 NOTE — TELEPHONE ENCOUNTER
MEDICATION PRIOR AUTHORIZATION NEEDED:    1. Name of Medication: Levothyroxine 50 MCG Tablet    2. Requested By (Name of Pharmacy): [facility]     3. Is insurance on file current? Yes    4. What is the name & phone number of the 3rd party payor? Payor # [id number] Ph:[phone]              MEDICATION PRIOR AUTHORIZATION NEEDED:    1. Name of Medication:Pravastatin Sodium 40MG Tab    2. Requested By (Name of Pharmacy): [facility]     3. Is insurance on file current? Yes    4. What is the name & phone number of the 3rd party payor? Payor # [id number] Ph:[phone]

## 2018-10-08 DIAGNOSIS — E78.2 MIXED DYSLIPIDEMIA: ICD-10-CM

## 2018-10-08 RX ORDER — PRAVASTATIN SODIUM 40 MG
40 TABLET ORAL
Qty: 90 TAB | Refills: 2 | Status: SHIPPED | OUTPATIENT
Start: 2018-10-08 | End: 2018-12-14 | Stop reason: SDUPTHER

## 2018-10-08 NOTE — TELEPHONE ENCOUNTER
Was the patient seen in the last year in this department? Yes    Does patient have an active prescription for medications requested? Yes    Received Request Via: Pharmacy       Pharmacy would like you to update the prescription to see if anything has changed.

## 2018-10-08 NOTE — TELEPHONE ENCOUNTER
I do not understand why you are sending me this message.  Did you start the prior authorization process?

## 2018-10-29 ENCOUNTER — TELEPHONE (OUTPATIENT)
Dept: MEDICAL GROUP | Facility: MEDICAL CENTER | Age: 58
End: 2018-10-29

## 2018-10-29 NOTE — TELEPHONE ENCOUNTER
ESTABLISHED PATIENT PRE-VISIT PLANNING     Note: Patient will not be contacted if there is no indication to call.     1.  Reviewed notes from the last few office visits within the medical group: Yes  06/07/2018  2.  If any orders were placed at last visit or intended to be done for this visit (i.e. 6 mos follow-up), do we have Results/Consult Notes?        •  Labs - Labs ordered, NOT completed. Patient advised to complete prior to next appointment.   Note: If patient appointment is for lab review and patient did not complete labs, check with provider if OK to reschedule patient until labs completed.       •  Imaging - Imaging was not ordered at last office visit.       •  Referrals - No referrals were ordered at last office visit.    3. Is this appointment scheduled as a Hospital Follow-Up? No    4.  Immunizations were updated in [email or web address] using Web[employer]?: Yes       •  Web [employer] Recommendations: FLU, MMR , TD and ZOSTAVAX (Shingles)    5.  Patient is due for the following Health Maintenance Topics:   Health Maintenance Due   Topic Date Due   • IMM ZOSTER VACCINES (1 of 2) 09/10/2009   • PAP SMEAR / 06/20/2016   • MAMMOGRAM / PT would like order  07/21/2018   • IMM INFLUENZA (1) 09/01/2018       6.  MDX printed for Provider? NO    7.  Patient was informed to arrive 15 min prior to their scheduled appointment and bring in their medication bottles.        *refaxed records request for pap smear

## 2018-11-19 ENCOUNTER — TELEPHONE (OUTPATIENT)
Dept: MEDICAL GROUP | Facility: MEDICAL CENTER | Age: 58
End: 2018-11-19

## 2018-11-19 NOTE — TELEPHONE ENCOUNTER
VOICEMAIL  1. Caller Name: Laura Glass                          Call Back Number: [phone] (home) [phone] (work)      2. Message: PT IS CALLING saying she needs labs with your signature ordered.     *attempted to call and was sent to  to ask pt to clarify.     3. Patient approves office to leave a detailed voicemail/MyChart message: N\A

## 2018-11-20 ENCOUNTER — TELEPHONE (OUTPATIENT)
Dept: MEDICAL GROUP | Facility: MEDICAL CENTER | Age: 58
End: 2018-11-20

## 2018-11-20 NOTE — TELEPHONE ENCOUNTER
Patient needs to clarify what labs she thinks she needs.  I have already ordered the thyroid labs and cholesterol labs back in 06/2018.  If those are what she means, print a copy and mail them to her house.

## 2018-12-12 ENCOUNTER — TELEPHONE (OUTPATIENT)
Dept: MEDICAL GROUP | Facility: MEDICAL CENTER | Age: 58
End: 2018-12-12

## 2018-12-14 ENCOUNTER — OFFICE VISIT (OUTPATIENT)
Dept: MEDICAL GROUP | Facility: MEDICAL CENTER | Age: 58
End: 2018-12-14
Payer: COMMERCIAL

## 2018-12-14 VITALS
TEMPERATURE: 98.5 F | SYSTOLIC BLOOD PRESSURE: 100 MMHG | DIASTOLIC BLOOD PRESSURE: 64 MMHG | HEART RATE: 68 BPM | BODY MASS INDEX: 24.77 KG/M2 | HEIGHT: 68 IN | WEIGHT: 163.4 LBS | OXYGEN SATURATION: 93 %

## 2018-12-14 DIAGNOSIS — R23.2 HOT FLASHES: ICD-10-CM

## 2018-12-14 DIAGNOSIS — E78.2 MIXED DYSLIPIDEMIA: ICD-10-CM

## 2018-12-14 DIAGNOSIS — E03.9 ACQUIRED HYPOTHYROIDISM: ICD-10-CM

## 2018-12-14 DIAGNOSIS — Z12.31 ENCOUNTER FOR SCREENING MAMMOGRAM FOR MALIGNANT NEOPLASM OF BREAST: ICD-10-CM

## 2018-12-14 DIAGNOSIS — Z23 NEED FOR VACCINATION: ICD-10-CM

## 2018-12-14 DIAGNOSIS — Z00.00 ANNUAL PHYSICAL EXAM: Primary | ICD-10-CM

## 2018-12-14 PROCEDURE — 99396 PREV VISIT EST AGE 40-64: CPT | Performed by: FAMILY MEDICINE

## 2018-12-14 RX ORDER — PRAVASTATIN SODIUM 40 MG
40 TABLET ORAL
Qty: 90 TAB | Refills: 3 | Status: SHIPPED | OUTPATIENT
Start: 2018-12-14 | End: 2020-01-14

## 2018-12-14 RX ORDER — LEVOTHYROXINE SODIUM 0.05 MG/1
50 TABLET ORAL
Qty: 90 TAB | Refills: 3 | Status: SHIPPED | OUTPATIENT
Start: 2018-12-14 | End: 2020-01-14

## 2018-12-14 NOTE — LETTER
[facility]  Marquis Phelps M.D.  [address]  [place] [address]  Fax: [phone]   Authorization for Release/Disclosure of   Protected Health Information   Name: LAURA LEAHY : 1960 SSN: [id number]   Address: [address] [place] [address]  [place] [address] Phone:    [phone] (home) [phone] (work)   I authorize the entity listed below to release/disclose the PHI below to:   [facility]/Marquis Phelps M.D. and Marquis Phelps M.D.   Provider or Entity Name:  Dr. Blade Reyes   Address   [facility], [facility], [facility]   Phone:      Fax:     Reason for request: continuity of care   Information to be released:    [  ] LAST COLONOSCOPY,  including any PATH REPORT and follow-up  [  ] LAST FIT/COLOGUARD RESULT [  ] LAST DEXA  [X] LAST MAMMOGRAM  [  ] LAST PAP  [  ] LAST LABS [  ] RETINA EXAM REPORT  [  ] IMMUNIZATION RECORDS  [  ] Release all info      [  ] Check here and initial the line next to each item to release ALL health information INCLUDING  _____ Care and treatment for drug and / or alcohol abuse  _____ HIV testing, infection status, or AIDS  _____ Genetic Testing    DATES OF SERVICE OR TIME PERIOD TO BE DISCLOSED: _____________  I understand and acknowledge that:  * This Authorization may be revoked at any time by you in writing, except if your health information has already been used or disclosed.  * Your health information that will be used or disclosed as a result of you signing this authorization could be re-disclosed by the recipient. If this occurs, your re-disclosed health information may no longer be protected by State or Federal laws.  * You may refuse to sign this Authorization. Your refusal will not affect your ability to obtain treatment.  * This Authorization becomes effective upon signing and will  on (date) __________.      If no date is indicated, this Authorization will  one (1) year from the signature date.    Name: Laura Gabriel  Maria Luisa    Signature:   Date:     12/14/2018       PLEASE FAX REQUESTED RECORDS BACK TO: [phone]

## 2018-12-19 NOTE — ASSESSMENT & PLAN NOTE
Patient and I discussed recent labs (see below; mixed dyslipidemia, uncontrolled) and that ASCVD risk is increased based on most recent lipid panel, current blood pressure (non-hypertensive), diabetes status (non-diabetic), and smoking status (non-smoker).    Patient and I then discussed necessary dietary changes to make to address dyslipidemia.  Patient was formerly taking cholesterol lowering medication: Pravastatin new discussed restarting the medication at this time.  patient verbalized understanding.    ROS is NEGATIVE for dizziness, generalized weakness/fatigue, vision/hearing changes, jaw pain/paresthesias, BUE pain/paresthesias/numbness/weakness, chest pain/pressure, palpitations, dyspnea, RUQ abdominal pain, oliguria/anuria, BLE edema.    Lab Results   Component Value Date/Time    CHOLSTRLTOT 251 (H) 05/31/2018 09:19 AM     (H) 05/31/2018 09:19 AM    HDL 69 05/31/2018 09:19 AM    TRIGLYCERIDE 180 (H) 05/31/2018 09:19 AM

## 2018-12-19 NOTE — ASSESSMENT & PLAN NOTE
Chronic, stable, well-controlled, taking medication as directed.     ROS is NEGATIVE for: cold or heat intolerance, anxiety/depression, chest pain/pressure, palpitations, hair thickening/coarsening/falling out/thinning, skin changes, diarrhea/constipation, unexpected weight change.

## 2018-12-19 NOTE — PROGRESS NOTES
Subjective:   Chief Complaint/History of Present Illness:  Laura Glass is a 58 y.o. female established who presents today for Annual Medical exam, to review the following medical problems.      The primary encounter diagnosis was Annual physical exam. Diagnoses of Encounter for screening mammogram for malignant neoplasm of breast, Mixed dyslipidemia, Acquired hypothyroidism, Hot flashes, and Need for vaccination were also pertinent to this visit.    PMH, PSH, Social History, Medications, Allergies, FMH all reviewed as documented:    Mixed dyslipidemia  Patient and I discussed recent labs (see below; mixed dyslipidemia, uncontrolled) and that ASCVD risk is increased based on most recent lipid panel, current blood pressure (non-hypertensive), diabetes status (non-diabetic), and smoking status (non-smoker).    Patient and I then discussed necessary dietary changes to make to address dyslipidemia.  Patient was formerly taking cholesterol lowering medication: Pravastatin new discussed restarting the medication at this time.  patient verbalized understanding.    ROS is NEGATIVE for dizziness, generalized weakness/fatigue, vision/hearing changes, jaw pain/paresthesias, BUE pain/paresthesias/numbness/weakness, chest pain/pressure, palpitations, dyspnea, RUQ abdominal pain, oliguria/anuria, BLE edema.    Lab Results   Component Value Date/Time    CHOLSTRLTOT 251 (H) 05/31/2018 09:19 AM     (H) 05/31/2018 09:19 AM    HDL 69 05/31/2018 09:19 AM    TRIGLYCERIDE 180 (H) 05/31/2018 09:19 AM       Acquired hypothyroidism  Chronic, stable, well-controlled, taking medication as directed.     ROS is NEGATIVE for: cold or heat intolerance, anxiety/depression, chest pain/pressure, palpitations, hair thickening/coarsening/falling out/thinning, skin changes, diarrhea/constipation, unexpected weight change.     Hot flashes  Chronic, stable, well-controlled, taking OTC medication as directed.       Patient Active Problem  List    Diagnosis Date Noted   • Acquired hypothyroidism 2018   • Mixed dyslipidemia 2015   • Hot flashes 2013       Additional History:   Allergies:    Patient has no known allergies.     Medications:     Current Outpatient Prescriptions Ordered in [facility]   Medication Sig Dispense Refill   • levothyroxine (SYNTHROID) 50 MCG Tab Take 1 Tab by mouth Every morning on an empty stomach. 90 Tab 3   • pravastatin (PRAVACHOL) 40 MG tablet Take 1 Tab by mouth every day. 90 Tab 3   • RESTASIS 0.05 % ophthalmic emulsion INSTILL 1 DROP INTO BOTH EYES TWICE A DAY AS DIRECTED  3   • albuterol 108 (90 BASE) MCG/ACT Aero Soln inhalation aerosol Inhale 2 Puffs by mouth every 6 hours as needed for Shortness of Breath. 8.5 g 0     No current Epic-ordered facility-administered medications on file.         Past Medical History:     Past Medical History:   Diagnosis Date   • Dyslipidemia 2012        Past Surgical History:     Past Surgical History:   Procedure Laterality Date   • EYE SURGERY      bilateral upper eyelids    • NASAL POLYPECTOMY     • PRIMARY C SECTION          Social History:     Social History   Substance Use Topics   • Smoking status: Never Smoker   • Smokeless tobacco: Never Used   • Alcohol use 8.4 oz/week     14 Shots of liquor per week        Family History:     Family Status   Relation Status   • Mo Alive   • Fa    • Bro Alive   • Son Alive   • Mary Alive   • Neg Hx (Not Specified)        Family History   Problem Relation Age of Onset   • Diabetes Mother    • Lung Disease Mother    • Hypertension Mother    • Hyperlipidemia Mother    • Other Father         Parkinson's   • Diabetes Father    • Diabetes Brother    • Other Brother         Multiple MSK issues   • Heart Disease Neg Hx    • Heart Attack Neg Hx    • Stroke Neg Hx        ROS:     - NOTE: All other systems reviewed and are negative, except as in HPI.     Objective:   Physical Exam:    Vitals: Blood pressure 100/64, pulse 68,  "temperature 36.9 °C (98.5 °F), height 1.727 m (5' 7.99\"), weight 74.1 kg (163 lb 6.4 oz), SpO2 93 %.   BMI: Body mass index is 24.85 kg/m².   General/Constitutional: Vitals as above, Well nourished, well developed female in no acute distress   Head/Eyes: Head is grossly normal & atraumatic, bilateral conjunctivae clear and not injected, bilateral EOMI, bilateral PERRL   ENT: Bilateral external ears grossly normal in appearance, Hearing grossly intact, External nares normal in appearance and without discharge/bleeding   Respiratory: No respiratory distress, bilateral lungs are clear to ausculation in all lung fields (anterior/lateral/posterior), no wheezing/rhonchi/rales   Cardiovascular: Regular rate and rhythm without murmur/gallops/rubs, distal pulses are intact and equal bilaterally (radial, posterior tibial), no bilateral lower extremity edema   MSK: Gait grossly normal & not antalgic   Integumentary: No apparent rashes   Psych: Judgment grossly appropriate, no apparent depression/anxiety    Health Maintenance:     - 07/13/17 -- Pap NIL, HPV NEG    - Due, and ordered: Mammo, Zoster Vaccine    - Due, but declined: Influenza vaccine    Imaging/Labs:     - 12/07/18 -- Pure hypercholesterolemia, TSH WNL    Assessment and Plan:   1. Annual physical exam  Patient in relatively good health    2. Encounter for screening mammogram for malignant neoplasm of breast   - MA-SCREENING MAMMO BILAT W/TOMOSYNTHESIS W/CAD; Future    3. Mixed dyslipidemia  Chronic, uncontrolled, patient advised to pursue lifestyle changes, particularly cardiovascular exercise and increasing proportion of plant-based nutrition.  Evaluate for Lipoprotein A variant, also for by evaluating other labs, as below.  Patient to restart taking pravastatin.   - LIPOPROTEIN A; Future   - HOMOCYSTEINE; Future   - CRP HIGH SENSITIVE (CARDIAC); Future   - pravastatin (PRAVACHOL) 40 MG tablet; Take 1 Tab by mouth every day.  Dispense: 90 Tab; Refill: 3    4. " Acquired hypothyroidism  Chronic, stable, well-controlled.  Continue taking medication as directed.    - levothyroxine (SYNTHROID) 50 MCG Tab; Take 1 Tab by mouth Every morning on an empty stomach.  Dispense: 90 Tab; Refill: 3    5. Hot flashes  Chronic, stable, well-controlled.  Continue taking OTC medication as directed.     6. Need for vaccination   - Zoster Vac Recomb Adjuvanted (SHINGRIX) 50 MCG Recon Susp; 0.5 mL by Intramuscular route Once for 1 dose.  Dispense: 0.5 mL; Refill: 1      RTC: in 1year for Annual Medical exam, but may need to come in sooner depending on labs.    PLEASE NOTE: This dictation was created using voice recognition software. I have made every reasonable attempt to correct obvious errors, but I expect that there are errors of grammar and possibly content that I did not discover before finalizing the note.

## 2019-04-24 ENCOUNTER — HOSPITAL ENCOUNTER (OUTPATIENT)
Dept: RADIOLOGY | Facility: MEDICAL CENTER | Age: 59
End: 2019-04-24
Attending: FAMILY MEDICINE
Payer: COMMERCIAL

## 2019-04-24 DIAGNOSIS — Z12.31 ENCOUNTER FOR SCREENING MAMMOGRAM FOR MALIGNANT NEOPLASM OF BREAST: ICD-10-CM

## 2019-04-24 PROCEDURE — 77063 BREAST TOMOSYNTHESIS BI: CPT

## 2019-11-22 ENCOUNTER — HOSPITAL ENCOUNTER (OUTPATIENT)
Dept: LAB | Facility: MEDICAL CENTER | Age: 59
End: 2019-11-22
Attending: FAMILY MEDICINE
Payer: COMMERCIAL

## 2019-11-22 DIAGNOSIS — E78.2 MIXED DYSLIPIDEMIA: ICD-10-CM

## 2019-11-22 LAB
CRP SERPL HS-MCNC: 64.3 MG/L (ref 0–7.5)
HCYS SERPL-SCNC: 11.34 UMOL/L

## 2019-11-22 PROCEDURE — 86141 C-REACTIVE PROTEIN HS: CPT

## 2019-11-22 PROCEDURE — 83695 ASSAY OF LIPOPROTEIN(A): CPT

## 2019-11-22 PROCEDURE — 36415 COLL VENOUS BLD VENIPUNCTURE: CPT

## 2019-11-22 PROCEDURE — 83090 ASSAY OF HOMOCYSTEINE: CPT

## 2019-11-24 LAB — LPA SERPL-MCNC: 55 MG/DL

## 2019-12-16 ENCOUNTER — OFFICE VISIT (OUTPATIENT)
Dept: MEDICAL GROUP | Facility: LAB | Age: 59
End: 2019-12-16
Payer: COMMERCIAL

## 2019-12-16 VITALS
OXYGEN SATURATION: 96 % | SYSTOLIC BLOOD PRESSURE: 128 MMHG | TEMPERATURE: 98.3 F | RESPIRATION RATE: 12 BRPM | BODY MASS INDEX: 24.88 KG/M2 | WEIGHT: 168 LBS | HEIGHT: 69 IN | DIASTOLIC BLOOD PRESSURE: 60 MMHG | HEART RATE: 68 BPM

## 2019-12-16 DIAGNOSIS — Z13.1 SCREENING FOR DIABETES MELLITUS: ICD-10-CM

## 2019-12-16 DIAGNOSIS — R79.89 HOMOCYSTEINE LEVEL ABOVE REFERENCE RANGE: ICD-10-CM

## 2019-12-16 DIAGNOSIS — M77.11 LATERAL EPICONDYLITIS OF RIGHT ELBOW: ICD-10-CM

## 2019-12-16 DIAGNOSIS — E78.2 MIXED DYSLIPIDEMIA: ICD-10-CM

## 2019-12-16 DIAGNOSIS — E03.9 ACQUIRED HYPOTHYROIDISM: ICD-10-CM

## 2019-12-16 DIAGNOSIS — Z23 NEED FOR VACCINATION: ICD-10-CM

## 2019-12-16 DIAGNOSIS — M77.41 METATARSALGIA OF RIGHT FOOT: ICD-10-CM

## 2019-12-16 PROCEDURE — 90750 HZV VACC RECOMBINANT IM: CPT | Performed by: FAMILY MEDICINE

## 2019-12-16 PROCEDURE — 99214 OFFICE O/P EST MOD 30 MIN: CPT | Mod: 25 | Performed by: FAMILY MEDICINE

## 2019-12-16 PROCEDURE — 90471 IMMUNIZATION ADMIN: CPT | Performed by: FAMILY MEDICINE

## 2019-12-16 ASSESSMENT — ANXIETY QUESTIONNAIRES
GAD7 TOTAL SCORE: 6
7. FEELING AFRAID AS IF SOMETHING AWFUL MIGHT HAPPEN: NOT AT ALL
3. WORRYING TOO MUCH ABOUT DIFFERENT THINGS: NEARLY EVERY DAY
6. BECOMING EASILY ANNOYED OR IRRITABLE: NOT AT ALL
1. FEELING NERVOUS, ANXIOUS, OR ON EDGE: NOT AT ALL
4. TROUBLE RELAXING: NEARLY EVERY DAY
5. BEING SO RESTLESS THAT IT IS HARD TO SIT STILL: NOT AT ALL
2. NOT BEING ABLE TO STOP OR CONTROL WORRYING: NOT AT ALL

## 2019-12-16 ASSESSMENT — ENCOUNTER SYMPTOMS
DIARRHEA: 0
CHILLS: 0
SHORTNESS OF BREATH: 0
WHEEZING: 0
SORE THROAT: 0
FOCAL WEAKNESS: 0
NAUSEA: 0
VOMITING: 0
HEADACHES: 0
CONSTIPATION: 0
COUGH: 0
ABDOMINAL PAIN: 0
FEVER: 0
MYALGIAS: 0
PALPITATIONS: 0
DIZZINESS: 0

## 2019-12-16 ASSESSMENT — PATIENT HEALTH QUESTIONNAIRE - PHQ9: CLINICAL INTERPRETATION OF PHQ2 SCORE: 0

## 2019-12-16 NOTE — PROGRESS NOTES
Laura Glass is a 59 y.o. female here to establish care and discuss elbow pain, toe pain, and chronic conditions.    HPI:     Right elbow pain  Started 3 months ago, severe at times, up to 8/10. Generally dull and worse with flexing arm, or worse with holding grandaughter and will get better when she holds her less.    Toe pain  Thinks she fractured toes 3,4, and 5 about 1.5 years ago.  Now has pain near the base of these toes on the plantar surface with walking.  Fine at rest.  She does feel like sometimes she has numbness in his toes as well.  She has not had imaging of this foot.  Has tried different footwear with walking without improvement.    Lipids  Has been on pravastatin for years with continued elevated cholesterol.  Reports relatively healthy diet and regular exercise but this has been decreased with toe pain.    Current medicines (including changes today)  Current Outpatient Medications   Medication Sig Dispense Refill   • levothyroxine (SYNTHROID) 50 MCG Tab Take 1 Tab by mouth Every morning on an empty stomach. 90 Tab 3   • pravastatin (PRAVACHOL) 40 MG tablet Take 1 Tab by mouth every day. 90 Tab 3   • RESTASIS 0.05 % ophthalmic emulsion INSTILL 1 DROP INTO BOTH EYES TWICE A DAY AS DIRECTED  3   • albuterol 108 (90 BASE) MCG/ACT Aero Soln inhalation aerosol Inhale 2 Puffs by mouth every 6 hours as needed for Shortness of Breath. 8.5 g 0     No current facility-administered medications for this visit.      She  has a past medical history of Dyslipidemia (7/16/2012). She also has no past medical history of Breast cancer (HCC).  She  has a past surgical history that includes nasal polypectomy; primary c section; and eye surgery.  Social History     Tobacco Use   • Smoking status: Never Smoker   • Smokeless tobacco: Never Used   Substance Use Topics   • Alcohol use: Yes     Alcohol/week: 8.4 oz     Types: 14 Shots of liquor per week   • Drug use: No     Social History     Patient does not qualify  "to have social determinant information on file (likely too young).   Social History Narrative   • Not on file     Family History   Problem Relation Age of Onset   • Diabetes Mother    • Lung Disease Mother    • Hypertension Mother    • Hyperlipidemia Mother    • Other Father         Parkinson's   • Diabetes Father    • Diabetes Brother    • Other Brother         Multiple MSK issues   • Heart Disease Neg Hx    • Heart Attack Neg Hx    • Stroke Neg Hx      Family Status   Relation Name Status   • Mo  Alive   • Fa 82    • Bro  Alive   • Son 18 Alive   • Mary 28 Alive   • Neg Hx  (Not Specified)       ROS  Review of Systems   Constitutional: Negative for chills and fever.   HENT: Negative for congestion and sore throat.    Respiratory: Negative for cough, shortness of breath and wheezing.    Cardiovascular: Negative for chest pain and palpitations.   Gastrointestinal: Negative for abdominal pain, constipation, diarrhea, nausea and vomiting.   Musculoskeletal: Positive for joint pain. Negative for myalgias.   Skin: Negative for rash.   Neurological: Negative for dizziness, focal weakness and headaches.   All other systems reviewed and are negative.        Objective:     Physical Exam:  /60   Pulse 68   Temp 36.8 °C (98.3 °F)   Resp 12   Ht 1.74 m (5' 8.5\")   Wt 76.2 kg (168 lb)   SpO2 96%  Body mass index is 25.17 kg/m².  Constitutional: Alert. Well appearing. No distress.  Skin: Warm, dry, good turgor, no visible rashes.  Eye: Equal, round and reactive to light, conjunctiva clear, lids normal.  ENMT: Moist mucous membranes. Normal dentition. Oropharynx clear.  Neck: Trachea midline, no masses, no thyromegaly.   Respiratory: Normal effort. Lungs are clear to auscultation bilaterally.  Cardiovascular: Regular rate and rhythm. Normal S1/S2. No murmurs, rubs or gallops.   MSK: Tenderness over lateral epicondyle of right elbow with pain to this area with resisted extension.  Normal passive and active range " of motion of right shoulder elbow and wrist.  Normal strength to flexion and extension at right elbow.  There is tenderness to the plantar surface of the base of right toes 3, 4, and 5.  No obvious edema or erythema to this area.  Neuro: Moves all four extremities. No facial droop.  Psych: Answers questions appropriately. Normal affect and mood.    Assessment and Plan:     1. Lateral epicondylitis of right elbow  New issue.  Pain and tenderness over lateral epicondyle that has started after holding her grandchild often.  Suspect lateral epicondylitis.  Discussed treatment options including PT and brace, and for now she would like to try brace.  We can refer to PT in future if she is not improving.    2. Metatarsalgia of right foot  Chronic issue.  1.5 years of pain to the plantar surface at base of right toes 3 4 and 5 that occurs with walking.  She thinks may be related to remote injury.  Arthritis versus Wade's neuroma.  Will get x-ray and refer to podiatry.  - DX-FOOT-COMPLETE 3+ RIGHT; Future  - REFERRAL TO PODIATRY    3. Homocysteine level above reference range  New issue.  Homocystine was checked by previous provider.  We will check B12 and folate and replace if needed.  - VITAMIN B12; Future  - FOLATE; Future  - Basic Metabolic Panel; Future    4. Acquired hypothyroidism  Chronic issue.  Continuing current replacement dose and checking TSH.  - TSH; Future    5. Mixed dyslipidemia  Chronic issue.  She continues on pravastatin.  She has had continued elevated cholesterol while on this.  Cardiac CRP lipoprotein a were also elevated.  Rechecking lipids, could consider increasing statin dosage.  - Lipid Profile; Future    6. Screening for diabetes mellitus  - HEMOGLOBIN A1C; Future    7. Need for vaccination  - Shingles Vaccine (Shingrix)    Follow up: Return in about 6 months (around 6/16/2020) for annual visit.         PLEASE NOTE: This note was created using voice recognition software.

## 2019-12-20 ENCOUNTER — APPOINTMENT (OUTPATIENT)
Dept: RADIOLOGY | Facility: MEDICAL CENTER | Age: 59
End: 2019-12-20
Attending: FAMILY MEDICINE
Payer: COMMERCIAL

## 2019-12-27 ENCOUNTER — HOSPITAL ENCOUNTER (OUTPATIENT)
Dept: RADIOLOGY | Facility: MEDICAL CENTER | Age: 59
End: 2019-12-27
Attending: FAMILY MEDICINE
Payer: COMMERCIAL

## 2019-12-27 DIAGNOSIS — M77.41 METATARSALGIA OF RIGHT FOOT: ICD-10-CM

## 2019-12-27 PROCEDURE — 73630 X-RAY EXAM OF FOOT: CPT | Mod: RT

## 2020-01-23 LAB — HBA1C MFR BLD: 5.5 % (ref 0–5.6)

## 2020-01-24 DIAGNOSIS — E78.2 MIXED DYSLIPIDEMIA: ICD-10-CM

## 2020-01-24 RX ORDER — PRAVASTATIN SODIUM 80 MG/1
80 TABLET ORAL DAILY
Qty: 30 TAB | Refills: 11 | Status: SHIPPED | OUTPATIENT
Start: 2020-01-24 | End: 2021-02-18

## 2020-02-06 ENCOUNTER — OFFICE VISIT (OUTPATIENT)
Dept: MEDICAL GROUP | Facility: LAB | Age: 60
End: 2020-02-06
Payer: COMMERCIAL

## 2020-02-06 VITALS
HEART RATE: 85 BPM | BODY MASS INDEX: 24.73 KG/M2 | SYSTOLIC BLOOD PRESSURE: 120 MMHG | RESPIRATION RATE: 12 BRPM | WEIGHT: 167 LBS | OXYGEN SATURATION: 95 % | TEMPERATURE: 98.1 F | HEIGHT: 69 IN | DIASTOLIC BLOOD PRESSURE: 84 MMHG

## 2020-02-06 DIAGNOSIS — J01.90 ACUTE NON-RECURRENT SINUSITIS, UNSPECIFIED LOCATION: ICD-10-CM

## 2020-02-06 DIAGNOSIS — J40 BRONCHITIS: ICD-10-CM

## 2020-02-06 PROCEDURE — 99214 OFFICE O/P EST MOD 30 MIN: CPT | Performed by: FAMILY MEDICINE

## 2020-02-06 RX ORDER — AMOXICILLIN AND CLAVULANATE POTASSIUM 875; 125 MG/1; MG/1
1 TABLET, FILM COATED ORAL 2 TIMES DAILY
Qty: 10 TAB | Refills: 0 | Status: SHIPPED
Start: 2020-02-06 | End: 2020-02-11

## 2020-02-06 RX ORDER — ALBUTEROL SULFATE 90 UG/1
2 AEROSOL, METERED RESPIRATORY (INHALATION) EVERY 4 HOURS PRN
Qty: 1 INHALER | Refills: 0 | Status: SHIPPED | OUTPATIENT
Start: 2020-02-06 | End: 2020-02-24

## 2020-02-06 ASSESSMENT — PATIENT HEALTH QUESTIONNAIRE - PHQ9: CLINICAL INTERPRETATION OF PHQ2 SCORE: 0

## 2020-02-06 NOTE — PROGRESS NOTES
"Subjective:     CC: Sinus pain, chest tightness    HPI:   Laura presents today with sinus pain and breathing symptoms    Sinus pain/pressure  Started 3 weeks ago.  Initially improved but then returned.  Now feels like she also has chest cold/tightness. No trouble breathing but does have wheezing with deep breaths.  Mild dry cough.  Clear nasal congestion.  No sore throat. No fevers, does have subjective chills. No chest pain. Has had some diarrhea, no nausea, no vomiting.   Has used albuterol for this in past with improvement.  Not trying anything else at this time.  No travel.    Health Maintenance: Completed    Medications, past medical history, allergies, and social history have been reviewed and updated.    ROS: See HPI      Objective:       Exam:  /84 (BP Location: Right arm, Patient Position: Sitting, BP Cuff Size: Adult)   Pulse 85   Temp 36.7 °C (98.1 °F)   Resp 12   Ht 1.74 m (5' 8.5\")   Wt 75.8 kg (167 lb)   SpO2 95%   BMI 25.02 kg/m²  Body mass index is 25.02 kg/m².    Constitutional: Alert. Well appearing. No distress.  Skin: Warm, dry, good turgor, no visible rashes.  Eye: Equal, round and reactive to light, conjunctiva clear, lids normal.  ENMT: Moist mucous membranes. Normal dentition.  Clear oropharynx.  Right ear canal with impacted cerumen.  Left tympanic membrane is clear.  Clear rhinorrhea.  No sinus tenderness.  Respiratory: Normal effort.  Expiratory wheezes present to right upper lung fields.  Cardiovascular: Regular rate and rhythm. Normal S1/S2. No murmurs, rubs or gallops.   Neuro: Moves all four extremities. No facial droop.  Psych: Answers questions appropriately. Normal affect and mood.      Assessment & Plan:     59 y.o. female with the following -     1. Acute non-recurrent sinusitis, unspecified location  Reports 3 weeks of continued sinus pain/pressure with initial improvement but then worsening.  Subjective chills but no fevers.  Suspect acute bacterial sinusitis " versus recurrent viral upper respiratory infection she does have new breathing symptoms as below.  Did provide Rx for Augmentin to fill if she is not improving within the next few days, or develops fevers or worsening symptoms.  Taurus return precautions.  - amoxicillin-clavulanate (AUGMENTIN) 875-125 MG Tab; Take 1 Tab by mouth 2 times a day for 5 days.  Dispense: 10 Tab; Refill: 0    2. Bronchitis  Congestion, dry cough, chest tightness and wheezing on exam.  No respiratory distress and vitals reassuring.  She has used albuterol for the symptoms in the past and this is prescribed today.  Discussed supportive care and over-the-counter medications.  Discussed return precautions for new or worsening symptoms.  - albuterol 108 (90 Base) MCG/ACT Aero Soln inhalation aerosol; Inhale 2 Puffs by mouth every four hours as needed for Shortness of Breath.  Dispense: 1 Inhaler; Refill: 0    Please note that this note was created using voice recognition software.

## 2020-02-24 DIAGNOSIS — J01.90 ACUTE NON-RECURRENT SINUSITIS, UNSPECIFIED LOCATION: ICD-10-CM

## 2020-02-24 RX ORDER — ALBUTEROL SULFATE 90 UG/1
AEROSOL, METERED RESPIRATORY (INHALATION)
Qty: 18 INHALER | Refills: 0 | Status: SHIPPED | OUTPATIENT
Start: 2020-02-24 | End: 2022-05-31

## 2020-02-24 NOTE — TELEPHONE ENCOUNTER
Received request via: Pharmacy    Was the patient seen in the last year in this department? Yes  2/6/20  Does the patient have an active prescription (recently filled or refills available) for medication(s) requested? No

## 2020-03-11 ENCOUNTER — NON-PROVIDER VISIT (OUTPATIENT)
Dept: OCCUPATIONAL MEDICINE | Facility: CLINIC | Age: 60
End: 2020-03-11

## 2020-03-11 VITALS — OXYGEN SATURATION: 98 % | HEART RATE: 78 BPM | TEMPERATURE: 98 F

## 2020-03-11 DIAGNOSIS — Z23 ENCOUNTER FOR IMMUNIZATION: ICD-10-CM

## 2020-03-11 DIAGNOSIS — Z71.84 TRAVEL ADVICE ENCOUNTER: ICD-10-CM

## 2020-03-11 PROCEDURE — 90691 TYPHOID VACCINE IM: CPT | Performed by: NURSE PRACTITIONER

## 2020-03-11 PROCEDURE — 90472 IMMUNIZATION ADMIN EACH ADD: CPT | Performed by: NURSE PRACTITIONER

## 2020-03-11 PROCEDURE — 90734 MENACWYD/MENACWYCRM VACC IM: CPT | Performed by: NURSE PRACTITIONER

## 2020-03-11 PROCEDURE — 90471 IMMUNIZATION ADMIN: CPT | Performed by: NURSE PRACTITIONER

## 2020-03-11 PROCEDURE — 90636 HEP A/HEP B VACC ADULT IM: CPT | Performed by: NURSE PRACTITIONER

## 2020-03-11 NOTE — PROGRESS NOTES
Travel dates: 11/1-11/15-20  Countries to be visited: [facility]  Reason for travel: vacation- pt will be on guided tour with spouse. Travel are will be approximately w/i 100mi radius of [facility]      Rural travel: yes  High altitude travel: no    Accommodations:  Hotel: yes    Hostel:  Camping: tents- yutrs  Cruise:  With family:  Other:    Vaccines in past 30 days? No  Sick today? No  Allergies: None    The screening intake form was reviewed with the traveler. Health risks associated with their travel plans have been reviewed and discussed with the traveler. The traveler has been provided with vaccine information statements for the vaccines that are recommended and given the opportunity to discuss risks and benefits of vaccination and or medications. The traveler has received education on the travel itinerary provided and on the following topics.    Personal safety precautions: Yes  Food and water precautions: Yes  Management of traveler's diarrhea: Yes  Mosquito/insect bite prevention:Yes  Animal bites/Rabies prevention: No  High altitude precautions: No      RN comments:     Typhoid, Twinrix, and MenACWY vaccine administered, vis given.    Travelers diarrhea self tx recommended. Patient aware of risks and benefits but declines medication at this time.  Pt stated she will see her PCP for Rx. Discussed Azithromycin.   Malaria prophylaxis recommended. Patient aware of risks and benefits but declines medication at this time.  Pt states she will see her PCP for Rx. Discussed Malarone          Physician consultation required: no

## 2020-04-15 ENCOUNTER — NON-PROVIDER VISIT (OUTPATIENT)
Dept: OCCUPATIONAL MEDICINE | Facility: CLINIC | Age: 60
End: 2020-04-15

## 2020-04-15 DIAGNOSIS — Z23 ENCOUNTER FOR IMMUNIZATION: ICD-10-CM

## 2020-04-15 PROCEDURE — 90636 HEP A/HEP B VACC ADULT IM: CPT

## 2020-04-15 PROCEDURE — 90471 IMMUNIZATION ADMIN: CPT

## 2020-04-15 NOTE — PROGRESS NOTES
"Pt was seen for vaccines only.    The patient completed a \"Screening checklist for contraindications to vaccines for adults\" form before receiving vaccinations.    1. Are you sick today?  no  2. Do you have allergies to medications, food, a vaccine component, or latex?  no  3. Have you ever had a serious reaction after receiving a vaccination?  no  4. Do you have a long-term health problem with heart disease, lung disease, asthma, kidney disease, metabolic disease (e.g., diabetes), anemia, or other blood disorder?  no  5. Do you have cancer, leukemia, HIV/AIDS, or any other immune system problem?  no  6. In the past 3 months, have you taken medications that affect your immune system,  such as prednisone, other steroids, or anticancer drugs; drugs for the treatment  of rheumatoid arthritis, Crohn’s disease, or psoriasis; or have you had radiation treatments?   no  7. Have you had a seizure or a brain or other nervous system problem?  no  8. During the past year, have you received a transfusion of blood or blood products, or been given immune (gamma) globulin or an antiviral drug? no  9. For women: Are you pregnant or is there a chance you could become pregnant during the next month? no  10. Have you received any vaccinations in the past 4 weeks? no        Hep A/B #2 vaccine administered. VIS given to pt.      Physician consultation not needed today.            "

## 2020-09-16 ENCOUNTER — NON-PROVIDER VISIT (OUTPATIENT)
Dept: OCCUPATIONAL MEDICINE | Facility: CLINIC | Age: 60
End: 2020-09-16

## 2020-09-16 VITALS — TEMPERATURE: 98 F

## 2020-09-16 DIAGNOSIS — Z23 ENCOUNTER FOR IMMUNIZATION: ICD-10-CM

## 2020-09-16 PROCEDURE — 90636 HEP A/HEP B VACC ADULT IM: CPT | Performed by: NURSE PRACTITIONER

## 2020-09-16 PROCEDURE — 90471 IMMUNIZATION ADMIN: CPT | Performed by: NURSE PRACTITIONER

## 2020-09-16 NOTE — PROGRESS NOTES
"Pt was seen for vaccines only.    The patient completed a \"Screening checklist for contraindications to vaccines for adults\" form before receiving vaccinations.    1. Are you sick today?  no  2. Do you have allergies to medications, food, a vaccine component, or latex?  no  3. Have you ever had a serious reaction after receiving a vaccination?  no  4. Do you have a long-term health problem with heart disease, lung disease, asthma, kidney disease, metabolic disease (e.g., diabetes), anemia, or other blood disorder?  no  5. Do you have cancer, leukemia, HIV/AIDS, or any other immune system problem?  no  6. In the past 3 months, have you taken medications that affect your immune system,  such as prednisone, other steroids, or anticancer drugs; drugs for the treatment  of rheumatoid arthritis, Crohn’s disease, or psoriasis; or have you had radiation treatments?   no  7. Have you had a seizure or a brain or other nervous system problem?  no  8. During the past year, have you received a transfusion of blood or blood products, or been given immune (gamma) globulin or an antiviral drug? no  9. For women: Are you pregnant or is there a chance you could become pregnant during the next month? no  10. Have you received any vaccinations in the past 4 weeks? no        Twinrix #3  vaccine administered. VIS given to pt.      Physician consultation not needed today.            "

## 2021-02-26 ENCOUNTER — TELEMEDICINE (OUTPATIENT)
Dept: MEDICAL GROUP | Facility: LAB | Age: 61
End: 2021-02-26
Payer: COMMERCIAL

## 2021-02-26 VITALS — HEIGHT: 69 IN | TEMPERATURE: 97.3 F | HEART RATE: 94 BPM | WEIGHT: 165 LBS | BODY MASS INDEX: 24.44 KG/M2

## 2021-02-26 DIAGNOSIS — E03.9 ACQUIRED HYPOTHYROIDISM: ICD-10-CM

## 2021-02-26 DIAGNOSIS — E78.2 MIXED DYSLIPIDEMIA: ICD-10-CM

## 2021-02-26 DIAGNOSIS — Z00.00 WELL ADULT EXAM: ICD-10-CM

## 2021-02-26 DIAGNOSIS — Z12.31 ENCOUNTER FOR SCREENING MAMMOGRAM FOR MALIGNANT NEOPLASM OF BREAST: ICD-10-CM

## 2021-02-26 PROCEDURE — 99214 OFFICE O/P EST MOD 30 MIN: CPT | Mod: 95,CR | Performed by: FAMILY MEDICINE

## 2021-02-26 RX ORDER — PRAVASTATIN SODIUM 80 MG/1
80 TABLET ORAL
Qty: 90 TABLET | Refills: 3 | Status: SHIPPED | OUTPATIENT
Start: 2021-02-26 | End: 2022-03-25

## 2021-02-26 RX ORDER — LEVOTHYROXINE SODIUM 0.05 MG/1
50 TABLET ORAL
Qty: 30 TABLET | Refills: 0 | Status: SHIPPED | OUTPATIENT
Start: 2021-02-26 | End: 2021-03-29

## 2021-02-26 RX ORDER — MULTIVIT-MIN/IRON/FOLIC ACID/K 18-600-40
CAPSULE ORAL
COMMUNITY

## 2021-02-26 ASSESSMENT — PATIENT HEALTH QUESTIONNAIRE - PHQ9: CLINICAL INTERPRETATION OF PHQ2 SCORE: 0

## 2021-02-26 NOTE — PROGRESS NOTES
Virtual Visit: Established Patient   This visit was conducted via Zoom using secure and encrypted videoconferencing technology. The patient was in a private location in the state of [place].    The patient's identity was confirmed and verbal consent was obtained for this virtual visit.    Subjective:   CC:   Chief Complaint   Patient presents with   • Follow-Up   • Medication Refill     Laura presents today for med refills on Synthroid and pravastatin.  No acute complaints.  Has not done labs for 1 year.  She has been working on exercise and diet to help with high cholesterol.  No recent changes to Synthroid dose,      ROS   See HPI    No Known Allergies    Current medicines (including changes today)  Current Outpatient Medications   Medication Sig Dispense Refill   • Multiple Vitamins-Minerals (CENTRUM ULTRA WOMENS PO) Take  by mouth.     • Cinnamon 500 MG Tab Take  by mouth.     • Calcium 200 MG Tab Take  by mouth.     • Cholecalciferol (VITAMIN D) 50 MCG (2000 UT) Cap Take  by mouth.     • coenzyme Q-10 30 MG capsule Take 60 mg by mouth every day.     • levothyroxine (SYNTHROID) 50 MCG Tab Take 1 tablet by mouth Every morning on an empty stomach. 30 tablet 0   • pravastatin (PRAVACHOL) 80 MG tablet Take 1 tablet by mouth every day. 90 tablet 3   • VENTOLIN  (90 Base) MCG/ACT Aero Soln inhalation aerosol INHALE 2 PUFFS BY MOUTH EVERY FOUR HOURS AS NEEDED FOR SHORTNESS OF BREATH. 18 Inhaler 0     No current facility-administered medications for this visit.       Patient Active Problem List    Diagnosis Date Noted   • Acquired hypothyroidism 03/07/2018   • Mixed dyslipidemia 11/20/2015   • Hot flashes 09/25/2013       Family History   Problem Relation Age of Onset   • Diabetes Mother    • Lung Disease Mother    • Hypertension Mother    • Hyperlipidemia Mother    • Other Father         Parkinson's   • Diabetes Father    • Diabetes Brother    • Other Brother         Multiple MSK issues   • Heart Disease Neg Hx   "  • Heart Attack Neg Hx    • Stroke Neg Hx        She  has a past medical history of Dyslipidemia (7/16/2012). She also has no past medical history of Breast cancer (HCC).  She  has a past surgical history that includes nasal polypectomy; primary c section; and eye surgery.       Objective:   Pulse 94 Comment: Verbal  Temp 36.3 °C (97.3 °F) Comment: Verbal  Ht 1.753 m (5' 9\") Comment: Verbal  Wt 74.8 kg (165 lb) Comment: Verbal  BMI 24.37 kg/m²     Physical Exam:  Constitutional: Alert, no distress, well-groomed.  Skin: No rashes in visible areas.  Eye: Round. Conjunctiva clear, lids normal. No icterus.   ENMT: Lips pink without lesions, good dentition, moist mucous membranes. Phonation normal.  Neck: No masses, no thyromegaly. Moves freely without pain.  Respiratory: Unlabored respiratory effort, no cough or audible wheeze  Psych: Alert and oriented x3, normal affect and mood.       Assessment and Plan:   The following treatment plan was discussed:     1. Acquired hypothyroidism  Stable, continue Synthroid, checking TSH.  - TSH WITH REFLEX TO FT4; Future  - levothyroxine (SYNTHROID) 50 MCG Tab; Take 1 tablet by mouth Every morning on an empty stomach.  Dispense: 30 tablet; Refill: 0    2. Mixed dyslipidemia  Stable, pravastatin increased to 80 mg after last lipids.  Rechecking lipids.  - Lipid Profile; Future  - pravastatin (PRAVACHOL) 80 MG tablet; Take 1 tablet by mouth every day.  Dispense: 90 tablet; Refill: 3    3. Well adult exam  Encouraged to schedule Pap.  - CBC WITH DIFFERENTIAL; Future  - Comp Metabolic Panel; Future    4. Encounter for screening mammogram for malignant neoplasm of breast  - MA-SCREENING MAMMO BILAT W/CAD; Future      Other orders  - Multiple Vitamins-Minerals (CENTRUM ULTRA WOMENS PO); Take  by mouth.  - Cinnamon 500 MG Tab; Take  by mouth.  - Calcium 200 MG Tab; Take  by mouth.  - Cholecalciferol (VITAMIN D) 50 MCG (2000 UT) Cap; Take  by mouth.  - coenzyme Q-10 30 MG capsule; Take " 60 mg by mouth every day.    Follow-up: Return in about 1 year (around 2/26/2022).

## 2021-03-15 DIAGNOSIS — Z23 NEED FOR VACCINATION: ICD-10-CM

## 2021-03-23 ENCOUNTER — IMMUNIZATION (OUTPATIENT)
Dept: FAMILY PLANNING/WOMEN'S HEALTH CLINIC | Facility: IMMUNIZATION CENTER | Age: 61
End: 2021-03-23
Attending: INTERNAL MEDICINE
Payer: COMMERCIAL

## 2021-03-23 DIAGNOSIS — Z23 ENCOUNTER FOR VACCINATION: Primary | ICD-10-CM

## 2021-03-23 DIAGNOSIS — Z23 NEED FOR VACCINATION: ICD-10-CM

## 2021-03-23 PROCEDURE — 0001A PFIZER SARS-COV-2 VACCINE: CPT

## 2021-03-23 PROCEDURE — 91300 PFIZER SARS-COV-2 VACCINE: CPT

## 2021-03-31 ENCOUNTER — PATIENT MESSAGE (OUTPATIENT)
Dept: MEDICAL GROUP | Facility: LAB | Age: 61
End: 2021-03-31

## 2021-03-31 DIAGNOSIS — Z12.83 SKIN CANCER SCREENING: ICD-10-CM

## 2021-03-31 NOTE — TELEPHONE ENCOUNTER
From: Laura Glass  To: Physician Felipe Ambriz  Sent: 3/31/2021 3:55 PM PDT  Subject: Non-Urgent Medical Question    Can you please send a referral to [facility],    [facility] Lesliemckenna Gutierrez   [phone]  I just have a few spots I would like checked out.    Thank you, Laura Glass

## 2021-04-16 ENCOUNTER — IMMUNIZATION (OUTPATIENT)
Dept: FAMILY PLANNING/WOMEN'S HEALTH CLINIC | Facility: IMMUNIZATION CENTER | Age: 61
End: 2021-04-16
Attending: INTERNAL MEDICINE
Payer: COMMERCIAL

## 2021-04-16 DIAGNOSIS — Z23 ENCOUNTER FOR VACCINATION: Primary | ICD-10-CM

## 2021-04-16 PROCEDURE — 91300 PFIZER SARS-COV-2 VACCINE: CPT

## 2021-04-16 PROCEDURE — 0002A PFIZER SARS-COV-2 VACCINE: CPT

## 2021-05-04 ENCOUNTER — HOSPITAL ENCOUNTER (OUTPATIENT)
Dept: RADIOLOGY | Facility: MEDICAL CENTER | Age: 61
End: 2021-05-04
Attending: FAMILY MEDICINE
Payer: COMMERCIAL

## 2021-05-04 DIAGNOSIS — Z12.31 ENCOUNTER FOR SCREENING MAMMOGRAM FOR MALIGNANT NEOPLASM OF BREAST: ICD-10-CM

## 2021-05-04 PROCEDURE — 77063 BREAST TOMOSYNTHESIS BI: CPT

## 2021-07-07 ENCOUNTER — APPOINTMENT (RX ONLY)
Dept: URBAN - METROPOLITAN AREA CLINIC 4 | Facility: CLINIC | Age: 61
Setting detail: DERMATOLOGY
End: 2021-07-07

## 2021-07-07 DIAGNOSIS — L81.4 OTHER MELANIN HYPERPIGMENTATION: ICD-10-CM

## 2021-07-07 DIAGNOSIS — D22 MELANOCYTIC NEVI: ICD-10-CM

## 2021-07-07 DIAGNOSIS — L21.8 OTHER SEBORRHEIC DERMATITIS: ICD-10-CM | Status: INADEQUATELY CONTROLLED

## 2021-07-07 DIAGNOSIS — D18.0 HEMANGIOMA: ICD-10-CM

## 2021-07-07 DIAGNOSIS — Z71.89 OTHER SPECIFIED COUNSELING: ICD-10-CM

## 2021-07-07 DIAGNOSIS — L82.1 OTHER SEBORRHEIC KERATOSIS: ICD-10-CM

## 2021-07-07 PROBLEM — D18.01 HEMANGIOMA OF SKIN AND SUBCUTANEOUS TISSUE: Status: ACTIVE | Noted: 2021-07-07

## 2021-07-07 PROBLEM — D22.5 MELANOCYTIC NEVI OF TRUNK: Status: ACTIVE | Noted: 2021-07-07

## 2021-07-07 PROCEDURE — ? COUNSELING

## 2021-07-07 PROCEDURE — 99204 OFFICE O/P NEW MOD 45 MIN: CPT

## 2021-07-07 PROCEDURE — ? PRESCRIPTION

## 2021-07-07 RX ORDER — KETOCONAZOLE 20 MG/G
1 CREAM TOPICAL BID
Qty: 1 | Refills: 1 | Status: ERX | COMMUNITY
Start: 2021-07-07

## 2021-07-07 RX ADMIN — KETOCONAZOLE 1: 20 CREAM TOPICAL at 00:00

## 2021-07-07 ASSESSMENT — LOCATION SIMPLE DESCRIPTION DERM
LOCATION SIMPLE: LEFT SCALP
LOCATION SIMPLE: RIGHT TEMPLE
LOCATION SIMPLE: ABDOMEN
LOCATION SIMPLE: RIGHT FOREHEAD
LOCATION SIMPLE: LEFT UPPER ARM
LOCATION SIMPLE: RIGHT UPPER BACK
LOCATION SIMPLE: LEFT TEMPLE
LOCATION SIMPLE: RIGHT UPPER ARM
LOCATION SIMPLE: UPPER BACK
LOCATION SIMPLE: CHEST

## 2021-07-07 ASSESSMENT — LOCATION DETAILED DESCRIPTION DERM
LOCATION DETAILED: LEFT ANTERIOR PROXIMAL UPPER ARM
LOCATION DETAILED: RIGHT SUPERIOR FOREHEAD
LOCATION DETAILED: RIGHT LATERAL TEMPLE
LOCATION DETAILED: RIGHT ANTERIOR PROXIMAL UPPER ARM
LOCATION DETAILED: LEFT MEDIAL FRONTAL SCALP
LOCATION DETAILED: RIGHT RIB CAGE
LOCATION DETAILED: LEFT LATERAL TEMPLE
LOCATION DETAILED: SUPERIOR THORACIC SPINE
LOCATION DETAILED: MIDDLE STERNUM
LOCATION DETAILED: LEFT CENTRAL FRONTAL SCALP
LOCATION DETAILED: RIGHT SUPERIOR UPPER BACK
LOCATION DETAILED: RIGHT SUPERIOR MEDIAL UPPER BACK
LOCATION DETAILED: RIGHT MID-UPPER BACK

## 2021-07-07 ASSESSMENT — LOCATION ZONE DERM
LOCATION ZONE: TRUNK
LOCATION ZONE: FACE
LOCATION ZONE: ARM
LOCATION ZONE: SCALP

## 2022-03-01 ENCOUNTER — PATIENT MESSAGE (OUTPATIENT)
Dept: MEDICAL GROUP | Facility: LAB | Age: 62
End: 2022-03-01
Payer: COMMERCIAL

## 2022-03-01 DIAGNOSIS — E03.9 ACQUIRED HYPOTHYROIDISM: ICD-10-CM

## 2022-03-01 DIAGNOSIS — E78.2 MIXED DYSLIPIDEMIA: ICD-10-CM

## 2022-03-01 DIAGNOSIS — Z00.00 WELL ADULT EXAM: ICD-10-CM

## 2022-03-25 DIAGNOSIS — E78.2 MIXED DYSLIPIDEMIA: ICD-10-CM

## 2022-03-25 RX ORDER — PRAVASTATIN SODIUM 80 MG/1
80 TABLET ORAL
Qty: 90 TABLET | Refills: 3 | Status: SHIPPED | OUTPATIENT
Start: 2022-03-25 | End: 2023-04-20

## 2022-03-25 NOTE — TELEPHONE ENCOUNTER
Received request via: Pharmacy    Was the patient seen in the last year in this department? No  LOV 02/26/2021    Next appointment scheduled for 04/05/2022  Does the patient have an active prescription (recently filled or refills available) for medication(s) requested? No

## 2022-03-29 ENCOUNTER — OFFICE VISIT (OUTPATIENT)
Dept: URGENT CARE | Facility: CLINIC | Age: 62
End: 2022-03-29
Payer: COMMERCIAL

## 2022-03-29 VITALS
TEMPERATURE: 98.5 F | OXYGEN SATURATION: 98 % | DIASTOLIC BLOOD PRESSURE: 84 MMHG | RESPIRATION RATE: 14 BRPM | HEIGHT: 69 IN | BODY MASS INDEX: 25.18 KG/M2 | HEART RATE: 78 BPM | WEIGHT: 170 LBS | SYSTOLIC BLOOD PRESSURE: 116 MMHG

## 2022-03-29 DIAGNOSIS — R05.9 COUGH: ICD-10-CM

## 2022-03-29 DIAGNOSIS — J06.9 UPPER RESPIRATORY TRACT INFECTION, UNSPECIFIED TYPE: ICD-10-CM

## 2022-03-29 DIAGNOSIS — J40 BRONCHITIS WITH WHEEZING: ICD-10-CM

## 2022-03-29 PROCEDURE — 99213 OFFICE O/P EST LOW 20 MIN: CPT | Performed by: PHYSICIAN ASSISTANT

## 2022-03-29 RX ORDER — ALBUTEROL SULFATE 90 UG/1
1-2 AEROSOL, METERED RESPIRATORY (INHALATION) EVERY 4 HOURS PRN
Qty: 1 EACH | Refills: 0 | Status: SHIPPED | OUTPATIENT
Start: 2022-03-29 | End: 2024-01-16 | Stop reason: SDUPTHER

## 2022-03-29 RX ORDER — PREDNISONE 20 MG/1
TABLET ORAL
Qty: 30 TABLET | Refills: 0 | Status: SHIPPED | OUTPATIENT
Start: 2022-03-29 | End: 2022-05-31

## 2022-03-29 RX ORDER — DEXTROMETHORPHAN HYDROBROMIDE AND PROMETHAZINE HYDROCHLORIDE 15; 6.25 MG/5ML; MG/5ML
5 SYRUP ORAL EVERY 4 HOURS PRN
Qty: 118 ML | Refills: 0 | Status: SHIPPED | OUTPATIENT
Start: 2022-03-29 | End: 2022-05-31

## 2022-03-30 NOTE — PROGRESS NOTES
"Subjective:   Laura Glass is a 61 y.o. female who presents for Cough (X4 days,  possible bronchitis )     Coughing, significant.  Started Saturday.  Difficulty sleeping due to cough.  Has tried lozenges and robitussin with minimal relief.  Cough non productive.  Only clear phlegm.  Some SOB; no fevers, chills, body aches.  No h/o asthma.  Has been given inhalers in the past for this problem.  Has been diagnosed with bronchitis on multiple occasions.  Had Covid in December, maybe has had cough since but now worse prompting evaluation today vaccinated.  Watches her  aged grandhectorter.      HPI      ROS    Medications:  Calcium Tabs  CENTRUM ULTRA WOMENS PO  Cinnamon Tabs  coenzyme Q-10  levothyroxine Tabs  pravastatin  Ventolin HFA Aers  Vitamin D Caps    Allergies:             Patient has no known allergies.    Surgical History:         Past Surgical History:   Procedure Laterality Date   • EYE SURGERY      bilateral upper eyelids    • NASAL POLYPECTOMY     • PRIMARY C SECTION         Past Social Hx:  Laura Glass  reports that she has never smoked. She has never used smokeless tobacco. She reports current alcohol use of about 8.4 oz of alcohol per week. She reports that she does not use drugs.     Past Family Hx:   Laura Glass family history includes Diabetes in her brother, father, and mother; Hyperlipidemia in her mother; Hypertension in her mother; Lung Disease in her mother; Other in her brother and father.       Problem list, medications, and allergies reviewed by myself today in Epic.     Objective:     /84   Pulse 78   Temp 36.9 °C (98.5 °F) (Temporal)   Resp 14   Ht 1.753 m (5' 9\")   Wt 77.1 kg (170 lb)   SpO2 98%   BMI 25.10 kg/m²     Physical Exam  Vitals and nursing note reviewed.   Constitutional:       General: She is not in acute distress.     Appearance: She is well-developed. She is not diaphoretic.   HENT:      Head: Normocephalic.      Right Ear: " Tympanic membrane, ear canal and external ear normal.      Left Ear: Tympanic membrane, ear canal and external ear normal.      Nose: Mucosal edema present. No rhinorrhea.      Mouth/Throat:      Pharynx: No posterior oropharyngeal erythema.   Eyes:      General:         Right eye: No discharge.         Left eye: No discharge.      Conjunctiva/sclera: Conjunctivae normal.      Pupils: Pupils are equal, round, and reactive to light.   Cardiovascular:      Rate and Rhythm: Normal rate and regular rhythm.      Pulses: Normal pulses.      Heart sounds: Normal heart sounds.   Pulmonary:      Effort: Prolonged expiration present. No tachypnea, accessory muscle usage or respiratory distress.      Breath sounds: No stridor. Examination of the right-upper field reveals wheezing. Examination of the left-upper field reveals wheezing. Examination of the right-middle field reveals wheezing. Examination of the left-middle field reveals wheezing. Wheezing present. No decreased breath sounds, rhonchi or rales.   Abdominal:      General: Bowel sounds are normal. There is no distension.      Palpations: Abdomen is soft.      Tenderness: There is no abdominal tenderness. There is no guarding or rebound.   Musculoskeletal:         General: Normal range of motion.      Cervical back: Normal range of motion.   Lymphadenopathy:      Cervical: No cervical adenopathy.   Skin:     General: Skin is warm and dry.   Neurological:      Mental Status: She is alert and oriented to person, place, and time.         Assessment/Plan:     Diagnosis and Associated Orders:     1. Cough  - albuterol 108 (90 Base) MCG/ACT Aero Soln inhalation aerosol; Inhale 1-2 Puffs every four hours as needed for Shortness of Breath.  Dispense: 1 Each; Refill: 0  - predniSONE (DELTASONE) 20 MG Tab; Tab 2 po qd x 5 days  Dispense: 30 Tablet; Refill: 0  - promethazine-dextromethorphan (PROMETHAZINE-DM) 6.25-15 MG/5ML syrup; Take 5 mL by mouth every four hours as needed for  Cough.  Dispense: 118 mL; Refill: 0    2. Bronchitis with wheezing  - albuterol 108 (90 Base) MCG/ACT Aero Soln inhalation aerosol; Inhale 1-2 Puffs every four hours as needed for Shortness of Breath.  Dispense: 1 Each; Refill: 0  - predniSONE (DELTASONE) 20 MG Tab; Tab 2 po qd x 5 days  Dispense: 30 Tablet; Refill: 0    3. Upper respiratory tract infection, unspecified type        Comments/MDM:  Medications as above.  Butyryl inhaler 2 puffs every 4 hours as needed.  Promethazine DM cough syrup as needed, cautioned sedation.  5 days of prednisone.  No suspicion for pneumonia currently given stable vital signs and lack of systemic symptoms.  To return to urgent care if no improvement in the next 48 to 72 hours.  May need chest x-ray at that time.  Recommend antihistamine daily.  Trial of Flonase.  Mucinex as needed.  Return precautions discussed.      I personally reviewed prior external notes and test results pertinent to today's visit.  Red flags discussed as well as indications to present to the Emergency Department.  Supportive care, natural history, differential diagnoses, and indications for immediate follow-up discussed.  Patient expresses understanding and agrees to plan.  Patient denies any other questions or concerns.    Follow-up with the primary care physician for recheck, reevaluation, and consideration of further management.      Please note that this dictation was created using voice recognition software. I have made a reasonable attempt to correct obvious errors, but I expect that there are errors of grammar and possibly content that I did not discover before finalizing the note.    This note was electronically signed by Maylin Hartman PA-C

## 2022-03-31 ENCOUNTER — HOSPITAL ENCOUNTER (OUTPATIENT)
Dept: LAB | Facility: MEDICAL CENTER | Age: 62
End: 2022-03-31
Attending: FAMILY MEDICINE
Payer: COMMERCIAL

## 2022-03-31 DIAGNOSIS — E03.9 ACQUIRED HYPOTHYROIDISM: ICD-10-CM

## 2022-03-31 DIAGNOSIS — E78.2 MIXED DYSLIPIDEMIA: ICD-10-CM

## 2022-03-31 DIAGNOSIS — Z00.00 WELL ADULT EXAM: ICD-10-CM

## 2022-03-31 LAB
ALBUMIN SERPL BCP-MCNC: 4.6 G/DL (ref 3.2–4.9)
ALBUMIN/GLOB SERPL: 2 G/DL
ALP SERPL-CCNC: 73 U/L (ref 30–99)
ALT SERPL-CCNC: 35 U/L (ref 2–50)
ANION GAP SERPL CALC-SCNC: 14 MMOL/L (ref 7–16)
AST SERPL-CCNC: 27 U/L (ref 12–45)
BASOPHILS # BLD AUTO: 0.5 % (ref 0–1.8)
BASOPHILS # BLD: 0.04 K/UL (ref 0–0.12)
BILIRUB SERPL-MCNC: 0.2 MG/DL (ref 0.1–1.5)
BUN SERPL-MCNC: 12 MG/DL (ref 8–22)
CALCIUM SERPL-MCNC: 9 MG/DL (ref 8.5–10.5)
CHLORIDE SERPL-SCNC: 105 MMOL/L (ref 96–112)
CHOLEST SERPL-MCNC: 222 MG/DL (ref 100–199)
CO2 SERPL-SCNC: 23 MMOL/L (ref 20–33)
CREAT SERPL-MCNC: 0.75 MG/DL (ref 0.5–1.4)
EOSINOPHIL # BLD AUTO: 0.1 K/UL (ref 0–0.51)
EOSINOPHIL NFR BLD: 1.2 % (ref 0–6.9)
ERYTHROCYTE [DISTWIDTH] IN BLOOD BY AUTOMATED COUNT: 48.5 FL (ref 35.9–50)
FASTING STATUS PATIENT QL REPORTED: NORMAL
GFR SERPLBLD CREATININE-BSD FMLA CKD-EPI: 90 ML/MIN/1.73 M 2
GLOBULIN SER CALC-MCNC: 2.3 G/DL (ref 1.9–3.5)
GLUCOSE SERPL-MCNC: 84 MG/DL (ref 65–99)
HCT VFR BLD AUTO: 41.2 % (ref 37–47)
HDLC SERPL-MCNC: 48 MG/DL
HGB BLD-MCNC: 13.4 G/DL (ref 12–16)
IMM GRANULOCYTES # BLD AUTO: 0.02 K/UL (ref 0–0.11)
IMM GRANULOCYTES NFR BLD AUTO: 0.2 % (ref 0–0.9)
LDLC SERPL CALC-MCNC: 116 MG/DL
LYMPHOCYTES # BLD AUTO: 4.12 K/UL (ref 1–4.8)
LYMPHOCYTES NFR BLD: 49.3 % (ref 22–41)
MCH RBC QN AUTO: 30.9 PG (ref 27–33)
MCHC RBC AUTO-ENTMCNC: 32.5 G/DL (ref 33.6–35)
MCV RBC AUTO: 95.2 FL (ref 81.4–97.8)
MONOCYTES # BLD AUTO: 0.52 K/UL (ref 0–0.85)
MONOCYTES NFR BLD AUTO: 6.2 % (ref 0–13.4)
NEUTROPHILS # BLD AUTO: 3.55 K/UL (ref 2–7.15)
NEUTROPHILS NFR BLD: 42.6 % (ref 44–72)
NRBC # BLD AUTO: 0 K/UL
NRBC BLD-RTO: 0 /100 WBC
PLATELET # BLD AUTO: 248 K/UL (ref 164–446)
PMV BLD AUTO: 10.2 FL (ref 9–12.9)
POTASSIUM SERPL-SCNC: 4.1 MMOL/L (ref 3.6–5.5)
PROT SERPL-MCNC: 6.9 G/DL (ref 6–8.2)
RBC # BLD AUTO: 4.33 M/UL (ref 4.2–5.4)
SODIUM SERPL-SCNC: 142 MMOL/L (ref 135–145)
TRIGL SERPL-MCNC: 289 MG/DL (ref 0–149)
TSH SERPL DL<=0.005 MIU/L-ACNC: 4.05 UIU/ML (ref 0.38–5.33)
WBC # BLD AUTO: 8.4 K/UL (ref 4.8–10.8)

## 2022-03-31 PROCEDURE — 80061 LIPID PANEL: CPT

## 2022-03-31 PROCEDURE — 36415 COLL VENOUS BLD VENIPUNCTURE: CPT

## 2022-03-31 PROCEDURE — 85025 COMPLETE CBC W/AUTO DIFF WBC: CPT

## 2022-03-31 PROCEDURE — 80053 COMPREHEN METABOLIC PANEL: CPT

## 2022-03-31 PROCEDURE — 84443 ASSAY THYROID STIM HORMONE: CPT

## 2022-04-06 ENCOUNTER — APPOINTMENT (OUTPATIENT)
Dept: RADIOLOGY | Facility: IMAGING CENTER | Age: 62
End: 2022-04-06
Attending: PHYSICIAN ASSISTANT
Payer: COMMERCIAL

## 2022-04-06 ENCOUNTER — OFFICE VISIT (OUTPATIENT)
Dept: URGENT CARE | Facility: CLINIC | Age: 62
End: 2022-04-06

## 2022-04-06 VITALS
RESPIRATION RATE: 20 BRPM | SYSTOLIC BLOOD PRESSURE: 128 MMHG | HEART RATE: 74 BPM | WEIGHT: 170 LBS | OXYGEN SATURATION: 95 % | TEMPERATURE: 98.7 F | HEIGHT: 69 IN | BODY MASS INDEX: 25.18 KG/M2 | DIASTOLIC BLOOD PRESSURE: 70 MMHG

## 2022-04-06 DIAGNOSIS — R05.9 COUGH: ICD-10-CM

## 2022-04-06 DIAGNOSIS — J22 LOWER RESPIRATORY TRACT INFECTION: ICD-10-CM

## 2022-04-06 DIAGNOSIS — H92.01 RIGHT EAR PAIN: ICD-10-CM

## 2022-04-06 DIAGNOSIS — J98.4 PNEUMONITIS: ICD-10-CM

## 2022-04-06 DIAGNOSIS — B96.89 BACTERIAL CONJUNCTIVITIS OF BOTH EYES: ICD-10-CM

## 2022-04-06 DIAGNOSIS — H10.9 BACTERIAL CONJUNCTIVITIS OF BOTH EYES: ICD-10-CM

## 2022-04-06 PROCEDURE — 71046 X-RAY EXAM CHEST 2 VIEWS: CPT | Mod: TC,FY | Performed by: PHYSICIAN ASSISTANT

## 2022-04-06 PROCEDURE — 99213 OFFICE O/P EST LOW 20 MIN: CPT | Performed by: PHYSICIAN ASSISTANT

## 2022-04-06 RX ORDER — CETIRIZINE HYDROCHLORIDE 10 MG/1
10 TABLET ORAL DAILY
COMMUNITY
End: 2022-05-31

## 2022-04-06 RX ORDER — DOXYCYCLINE 100 MG/1
100 CAPSULE ORAL 2 TIMES DAILY
Qty: 10 CAPSULE | Refills: 0 | Status: SHIPPED | OUTPATIENT
Start: 2022-04-06 | End: 2022-04-11

## 2022-04-06 RX ORDER — POLYMYXIN B SULFATE AND TRIMETHOPRIM 1; 10000 MG/ML; [USP'U]/ML
1 SOLUTION OPHTHALMIC EVERY 4 HOURS
Qty: 10 ML | Refills: 0 | Status: SHIPPED | OUTPATIENT
Start: 2022-04-06 | End: 2022-04-13

## 2022-04-06 RX ORDER — ALBUTEROL SULFATE 90 UG/1
1-2 AEROSOL, METERED RESPIRATORY (INHALATION) EVERY 4 HOURS PRN
Qty: 1 EACH | Refills: 1 | Status: SHIPPED | OUTPATIENT
Start: 2022-04-06 | End: 2023-06-01

## 2022-04-06 ASSESSMENT — VISUAL ACUITY: OU: 1

## 2022-04-06 ASSESSMENT — FIBROSIS 4 INDEX: FIB4 SCORE: 1.14

## 2022-04-06 NOTE — PROGRESS NOTES
"Subjective:   Laura Glass is a 61 y.o. female who presents for Sinus Problem (Last visit; 03/29/22, had fever 102F x 2 days ago, chills , sinus pressure, bilateral eyes are draining )     Candelaria returns today for interval evaluation.  She was last seen on March 29.  She was treated for bronchitis with prednisone and inhaler.  At that time she had not had any shortness of breath fevers or chills.  Today she reports that after completing the steroid she developed a temp of 102 on Monday with severe myalgias.  Her cough is returning.  Is not quite as bad.  She is developed redness and purulent drainage from her eyes and has some pressure behind her right ear.      Medications:  albuterol Aers  Calcium Tabs  CENTRUM ULTRA WOMENS PO  cetirizine Tabs  Cinnamon Tabs  coenzyme Q-10  levothyroxine Tabs  pravastatin  predniSONE Tabs  promethazine-dextromethorphan  Ventolin HFA Aers  Vitamin D Caps    Allergies:             Patient has no known allergies.    Surgical History:         Past Surgical History:   Procedure Laterality Date   • EYE SURGERY      bilateral upper eyelids    • NASAL POLYPECTOMY     • PRIMARY C SECTION         Past Social Hx:  Laura Glass  reports that she has never smoked. She has never used smokeless tobacco. She reports previous alcohol use of about 8.4 oz of alcohol per week. She reports that she does not use drugs.     Past Family Hx:   Laura Glass family history includes Diabetes in her brother, father, and mother; Hyperlipidemia in her mother; Hypertension in her mother; Lung Disease in her mother; Other in her brother and father.       Problem list, medications, and allergies reviewed by myself today in Epic.     Objective:     /70 (BP Location: Left arm, Patient Position: Sitting, BP Cuff Size: Adult)   Pulse 74   Temp 37.1 °C (98.7 °F) (Temporal)   Resp 20   Ht 1.753 m (5' 9\")   Wt 77.1 kg (170 lb)   SpO2 95%   Breastfeeding No   BMI 25.10 kg/m²     Physical " Exam  HENT:      Right Ear: Decreased hearing noted. There is impacted cerumen.      Ears:      Comments: The EAC occluded with cerumen, 100%  Eyes:      General: Lids are normal. Vision grossly intact. No allergic shiner, visual field deficit or scleral icterus.        Right eye: Discharge present.         Left eye: Discharge present.     Conjunctiva/sclera:      Right eye: Right conjunctiva is injected.      Left eye: Left conjunctiva is injected.      Comments: Conjunctivitis is noted with purulent discharge   Pulmonary:      Effort: Pulmonary effort is normal. No tachypnea, accessory muscle usage, prolonged expiration, respiratory distress or retractions.      Breath sounds: No decreased air movement. Examination of the right-upper field reveals wheezing. Examination of the left-upper field reveals wheezing. Examination of the right-middle field reveals wheezing. Examination of the left-middle field reveals wheezing. Decreased breath sounds and wheezing present.         RADIOLOGY RESULTS   DX-CHEST-2 VIEWS    Result Date: 4/6/2022 4/6/2022 11:22 AM HISTORY/REASON FOR EXAM:  Cough. TECHNIQUE/EXAM DESCRIPTION AND NUMBER OF VIEWS: Two views of the chest. COMPARISON:  None. FINDINGS: Heart size is normal. There is a rounded circumscribed lesion in the right cardiophrenic angle. Mild left basilar opacity. Right lung is clear. No pleural effusion or pneumothorax. No acute osseous abnormality.     1.  Mild left basilar opacity suspicious for pneumonitis. 2.  Circumscribed lesion/opacity in the right cardiophrenic angle is nonspecific. It could represent a pericardial cyst however suggest further evaluation with chest CT.           Assessment/Plan:     Diagnosis and Associated Orders:     1. Cough  - DX-CHEST-2 VIEWS; Future  - doxycycline (MONODOX) 100 MG capsule; Take 1 Capsule by mouth 2 times a day for 5 days.  Dispense: 10 Capsule; Refill: 0  - albuterol 108 (90 Base) MCG/ACT Aero Soln inhalation aerosol; Inhale  1-2 Puffs every four hours as needed for Shortness of Breath.  Dispense: 1 Each; Refill: 1    2. Bacterial conjunctivitis of both eyes  - polymixin-trimethoprim (POLYTRIM) 39152-6.1 UNIT/ML-% Solution; Administer 1 Drop into both eyes every 4 hours for 7 days.  Dispense: 10 mL; Refill: 0    3. Right ear pain    4. Lower respiratory tract infection  - doxycycline (MONODOX) 100 MG capsule; Take 1 Capsule by mouth 2 times a day for 5 days.  Dispense: 10 Capsule; Refill: 0    5. Pneumonitis            Comments/MDM:  Chest x-ray as above.  Sent findings and recommendations to patient via turntable.fmt as x-ray was not read while she was here in clinic.  Recommend treatment with doxycycline as above.  Polytrim drops for conjunctivitis to the both eyes.  Recommend follow-up with PCP regarding potential finding of lesion in the right costophrenic angle possibly consistent with cyst.  Return to clinic with increased chest pain, shortness of breath, difficulty breathing or swallowing.  Complete full course of eyedrops.  Continue albuterol inhaler every 4 hours.  Wean off steroids.      I personally reviewed prior external notes and test results pertinent to today's visit.  Red flags discussed as well as indications to present to the Emergency Department.  Supportive care, natural history, differential diagnoses, and indications for immediate follow-up discussed.  Patient expresses understanding and agrees to plan.  Patient denies any other questions or concerns.    Follow-up with the primary care physician for recheck, reevaluation, and consideration of further management.      Please note that this dictation was created using voice recognition software. I have made a reasonable attempt to correct obvious errors, but I expect that there are errors of grammar and possibly content that I did not discover before finalizing the note.    This note was electronically signed by Maylin Hartman PA-C

## 2022-04-08 ENCOUNTER — OFFICE VISIT (OUTPATIENT)
Dept: MEDICAL GROUP | Facility: LAB | Age: 62
End: 2022-04-08
Payer: COMMERCIAL

## 2022-04-08 VITALS
HEIGHT: 69 IN | DIASTOLIC BLOOD PRESSURE: 80 MMHG | HEART RATE: 80 BPM | TEMPERATURE: 97.4 F | RESPIRATION RATE: 16 BRPM | BODY MASS INDEX: 25.48 KG/M2 | OXYGEN SATURATION: 95 % | SYSTOLIC BLOOD PRESSURE: 124 MMHG | WEIGHT: 172 LBS

## 2022-04-08 DIAGNOSIS — R93.89 ABNORMAL CXR: ICD-10-CM

## 2022-04-08 DIAGNOSIS — J01.00 ACUTE NON-RECURRENT MAXILLARY SINUSITIS: ICD-10-CM

## 2022-04-08 PROCEDURE — 99214 OFFICE O/P EST MOD 30 MIN: CPT | Performed by: FAMILY MEDICINE

## 2022-04-08 RX ORDER — AMOXICILLIN AND CLAVULANATE POTASSIUM 875; 125 MG/1; MG/1
1 TABLET, FILM COATED ORAL 2 TIMES DAILY
Qty: 10 TABLET | Refills: 0 | Status: SHIPPED | OUTPATIENT
Start: 2022-04-08 | End: 2022-04-13

## 2022-04-08 ASSESSMENT — FIBROSIS 4 INDEX: FIB4 SCORE: 1.14

## 2022-04-08 ASSESSMENT — PATIENT HEALTH QUESTIONNAIRE - PHQ9: CLINICAL INTERPRETATION OF PHQ2 SCORE: 0

## 2022-04-08 NOTE — PROGRESS NOTES
"Subjective:     CC: Abnormal CXR, cough, congestion    HPI:   Laura presents today to follow-up on recent urgent care visits for cough and congestion.  She was seen in urgent care on 3/29 and 4/6.  At most recent visit she followed up after being treated for bronchitis with prednisone and an inhaler.  Symptoms continued and she had another fever.  Also developed conjunctivitis.  CXR showed mild left basilar opacities as suspicious for pneumonitis as well as an opacity to the right cardiophrenic angle.  She was treated with doxycycline and Polytrim.    She was having significant reflux with the doxycycline did not feel like symptoms were improving.   had leftover amoxicillin that she took last night and reports reflux symptoms improved and other symptoms seem to be improving much better as well.    Symptoms initially began about 2 weeks ago, she has had multiple home rapid Covid tests that were negative.  She also had Covid in December.  Does think symptoms initially improved after about a week but then acutely worsened.  Current symptoms include continued cough, sinus congestion, and occasional issues with breathing.  She is feeling better today.  No fevers since last urgent care visit.     Hercules chest congestion worsening yesterday and switched her antibiotic to left over amoxicillin that  had. She noted improvement     Medications, past medical history, allergies, and social history have been reviewed and updated.      Objective:       Exam:  /80 (BP Location: Right arm, Patient Position: Sitting, BP Cuff Size: Adult)   Pulse 80   Temp 36.3 °C (97.4 °F)   Resp 16   Ht 1.753 m (5' 9\")   Wt 78 kg (172 lb)   SpO2 95%   BMI 25.40 kg/m²  Body mass index is 25.4 kg/m².    Constitutional: Alert. Well appearing. No distress.  Skin: Warm, dry, good turgor, no visible rashes.  Eye: Equal, round and reactive to light, conjunctiva clear, lids normal.  ENMT: Moist mucous membranes.  Normal " oropharynx.  Tympanic membranes are clear.  No cervical LAD.  Respiratory: Normal effort. Lungs are clear to auscultation bilaterally.  Cardiovascular: Regular rate and rhythm. Normal S1/S2. No murmurs, rubs or gallops.   Neuro: Moves all four extremities. No facial droop.  Psych: Answers questions appropriately. Normal affect and mood.    Assessment & Plan:     61 y.o. female with the following -     1. Abnormal CXR  Needs follow-up chest CT for right costophrenic angle opacity noted on CXR.  - CT-CHEST (THORAX) W/O; Future    2. Acute non-recurrent maxillary sinusitis  She has had persistent cough and congestion for over 2 weeks with worsening after initial improvement.  I do think her initial symptoms and the conjunctivitis were likely viral but suspect secondary bacterial infection at this point.  She was seeing minimal improvement with doxycycline and this was giving her some esophageal side effects.  She took an amoxicillin last night and reports this seemed to help.  We will switch coverage to Augmentin to cover for both sinusitis and possible CAP.  Discussed reasons to seek care.  - amoxicillin-clavulanate (AUGMENTIN) 875-125 MG Tab; Take 1 Tablet by mouth 2 times a day for 5 days.  Dispense: 10 Tablet; Refill: 0      Please note that this note was created using voice recognition software.

## 2022-04-19 ENCOUNTER — HOSPITAL ENCOUNTER (OUTPATIENT)
Dept: RADIOLOGY | Facility: MEDICAL CENTER | Age: 62
End: 2022-04-19
Attending: FAMILY MEDICINE
Payer: COMMERCIAL

## 2022-04-19 DIAGNOSIS — R93.89 ABNORMAL CXR: ICD-10-CM

## 2022-04-19 PROCEDURE — 71250 CT THORAX DX C-: CPT

## 2022-05-31 ENCOUNTER — OFFICE VISIT (OUTPATIENT)
Dept: MEDICAL GROUP | Facility: LAB | Age: 62
End: 2022-05-31
Payer: COMMERCIAL

## 2022-05-31 VITALS
RESPIRATION RATE: 14 BRPM | WEIGHT: 173 LBS | TEMPERATURE: 97 F | BODY MASS INDEX: 25.62 KG/M2 | SYSTOLIC BLOOD PRESSURE: 126 MMHG | DIASTOLIC BLOOD PRESSURE: 84 MMHG | HEART RATE: 74 BPM | OXYGEN SATURATION: 94 % | HEIGHT: 69 IN

## 2022-05-31 DIAGNOSIS — Z23 NEED FOR VACCINATION: ICD-10-CM

## 2022-05-31 DIAGNOSIS — R07.0 THROAT PAIN IN ADULT: ICD-10-CM

## 2022-05-31 DIAGNOSIS — Z12.31 ENCOUNTER FOR SCREENING MAMMOGRAM FOR MALIGNANT NEOPLASM OF BREAST: ICD-10-CM

## 2022-05-31 PROCEDURE — 99214 OFFICE O/P EST MOD 30 MIN: CPT | Performed by: FAMILY MEDICINE

## 2022-05-31 ASSESSMENT — FIBROSIS 4 INDEX: FIB4 SCORE: 1.14

## 2022-05-31 NOTE — PROGRESS NOTES
"Subjective:     CC: Throat pain    HPI:   Laura presents today with:    Left throat pain  Going on for 4 to 6 weeks.  Present most of the time.  No increasing pain with swallowing or talking.  Does notice it may be worse with dehydration.  No food getting stuck, trouble swallowing.  Did have cough and congestion but this has resolved.  Denies significant postnasal drip, denies GERD/reflux.  Does report seasonal allergies, not sure if these are worse recently.    Medications, past medical history, allergies, and social history have been reviewed and updated.      Objective:       Exam:  /84 (BP Location: Right arm, Patient Position: Sitting, BP Cuff Size: Adult)   Pulse 74   Temp 36.1 °C (97 °F)   Resp 14   Ht 1.753 m (5' 9\")   Wt 78.5 kg (173 lb)   SpO2 94%   BMI 25.55 kg/m²  Body mass index is 25.55 kg/m².    Constitutional: Alert. Well appearing. No distress.  Skin: Warm, dry, good turgor, no visible rashes.  Eye: Equal, round and reactive to light, conjunctiva clear, lids normal.  ENMT: Moist mucous membranes. Normal dentition.  Oropharynx is clear.  No cervical LAD.  Tympanic membranes are clear.  No swelling or tenderness noted to the neck.  Respiratory: Normal effort.   Neuro: Moves all four extremities. No facial droop.  Psych: Answers questions appropriately. Normal affect and mood.      Assessment & Plan:     61 y.o. female with the following -     1. Throat pain in adult  Reports 4 to 6 weeks of pain to the left throat.  Exam is unremarkable.  Does have seasonal allergies and possibly this is due to irritation with postnasal drip.  Recommended restart Zyrtec and start Flonase.  If not improving within the next few weeks then we will plan for ENT referral for further evaluation.    2. Need for vaccination  Needs repeat typhoid vaccination for travel to [place] at the end of this year.  - typhoid polysaccharide vaccine (TYPHIM VI) 25 MCG/0.5ML injection; Inject 0.5 mL into the shoulder, thigh, " or buttocks one time for 1 dose.  Dispense: 0.5 mL; Refill: 0    3. Encounter for screening mammogram for malignant neoplasm of breast  - MA-SCREENING MAMMO BILAT W/TOMOSYNTHESIS W/CAD; Future      Please note that this note was created using voice recognition software.

## 2022-11-08 ENCOUNTER — HOSPITAL ENCOUNTER (OUTPATIENT)
Dept: RADIOLOGY | Facility: MEDICAL CENTER | Age: 62
End: 2022-11-08
Attending: FAMILY MEDICINE
Payer: COMMERCIAL

## 2022-11-08 DIAGNOSIS — Z12.31 ENCOUNTER FOR SCREENING MAMMOGRAM FOR MALIGNANT NEOPLASM OF BREAST: ICD-10-CM

## 2022-11-08 PROCEDURE — 77063 BREAST TOMOSYNTHESIS BI: CPT

## 2023-02-27 ENCOUNTER — TELEPHONE (OUTPATIENT)
Dept: MEDICAL GROUP | Facility: LAB | Age: 63
End: 2023-02-27
Payer: COMMERCIAL

## 2023-02-27 DIAGNOSIS — Z00.00 WELL ADULT EXAM: ICD-10-CM

## 2023-02-27 NOTE — TELEPHONE ENCOUNTER
Patient lvm stating she has a appointment on 4/06/23. She would like to know if she needs to do any lab work done before her appointment. If so please place lab orders

## 2023-04-04 ENCOUNTER — HOSPITAL ENCOUNTER (OUTPATIENT)
Dept: LAB | Facility: MEDICAL CENTER | Age: 63
End: 2023-04-04
Attending: FAMILY MEDICINE
Payer: COMMERCIAL

## 2023-04-04 DIAGNOSIS — Z00.00 WELL ADULT EXAM: ICD-10-CM

## 2023-04-04 LAB
ALBUMIN SERPL BCP-MCNC: 4.4 G/DL (ref 3.2–4.9)
ALBUMIN/GLOB SERPL: 1.4 G/DL
ALP SERPL-CCNC: 74 U/L (ref 30–99)
ALT SERPL-CCNC: 32 U/L (ref 2–50)
ANION GAP SERPL CALC-SCNC: 13 MMOL/L (ref 7–16)
AST SERPL-CCNC: 26 U/L (ref 12–45)
BASOPHILS # BLD AUTO: 0.5 % (ref 0–1.8)
BASOPHILS # BLD: 0.03 K/UL (ref 0–0.12)
BILIRUB SERPL-MCNC: 0.4 MG/DL (ref 0.1–1.5)
BUN SERPL-MCNC: 13 MG/DL (ref 8–22)
CALCIUM ALBUM COR SERPL-MCNC: 9.1 MG/DL (ref 8.5–10.5)
CALCIUM SERPL-MCNC: 9.4 MG/DL (ref 8.5–10.5)
CHLORIDE SERPL-SCNC: 102 MMOL/L (ref 96–112)
CHOLEST SERPL-MCNC: 274 MG/DL (ref 100–199)
CO2 SERPL-SCNC: 25 MMOL/L (ref 20–33)
CREAT SERPL-MCNC: 0.73 MG/DL (ref 0.5–1.4)
EOSINOPHIL # BLD AUTO: 0.15 K/UL (ref 0–0.51)
EOSINOPHIL NFR BLD: 2.6 % (ref 0–6.9)
ERYTHROCYTE [DISTWIDTH] IN BLOOD BY AUTOMATED COUNT: 46.4 FL (ref 35.9–50)
FASTING STATUS PATIENT QL REPORTED: NORMAL
GFR SERPLBLD CREATININE-BSD FMLA CKD-EPI: 92 ML/MIN/1.73 M 2
GLOBULIN SER CALC-MCNC: 3.2 G/DL (ref 1.9–3.5)
GLUCOSE SERPL-MCNC: 96 MG/DL (ref 65–99)
HCT VFR BLD AUTO: 44.1 % (ref 37–47)
HDLC SERPL-MCNC: 58 MG/DL
HGB BLD-MCNC: 14.3 G/DL (ref 12–16)
IMM GRANULOCYTES # BLD AUTO: 0.02 K/UL (ref 0–0.11)
IMM GRANULOCYTES NFR BLD AUTO: 0.3 % (ref 0–0.9)
LDLC SERPL CALC-MCNC: 152 MG/DL
LYMPHOCYTES # BLD AUTO: 2.41 K/UL (ref 1–4.8)
LYMPHOCYTES NFR BLD: 41.6 % (ref 22–41)
MCH RBC QN AUTO: 30.3 PG (ref 27–33)
MCHC RBC AUTO-ENTMCNC: 32.4 G/DL (ref 33.6–35)
MCV RBC AUTO: 93.4 FL (ref 81.4–97.8)
MONOCYTES # BLD AUTO: 0.41 K/UL (ref 0–0.85)
MONOCYTES NFR BLD AUTO: 7.1 % (ref 0–13.4)
NEUTROPHILS # BLD AUTO: 2.78 K/UL (ref 2–7.15)
NEUTROPHILS NFR BLD: 47.9 % (ref 44–72)
NRBC # BLD AUTO: 0 K/UL
NRBC BLD-RTO: 0 /100 WBC
PLATELET # BLD AUTO: 267 K/UL (ref 164–446)
PMV BLD AUTO: 9.7 FL (ref 9–12.9)
POTASSIUM SERPL-SCNC: 4 MMOL/L (ref 3.6–5.5)
PROT SERPL-MCNC: 7.6 G/DL (ref 6–8.2)
RBC # BLD AUTO: 4.72 M/UL (ref 4.2–5.4)
SODIUM SERPL-SCNC: 140 MMOL/L (ref 135–145)
TRIGL SERPL-MCNC: 322 MG/DL (ref 0–149)
TSH SERPL DL<=0.005 MIU/L-ACNC: 3.75 UIU/ML (ref 0.38–5.33)
WBC # BLD AUTO: 5.8 K/UL (ref 4.8–10.8)

## 2023-04-04 PROCEDURE — 85025 COMPLETE CBC W/AUTO DIFF WBC: CPT

## 2023-04-04 PROCEDURE — 80053 COMPREHEN METABOLIC PANEL: CPT

## 2023-04-04 PROCEDURE — 80061 LIPID PANEL: CPT

## 2023-04-04 PROCEDURE — 36415 COLL VENOUS BLD VENIPUNCTURE: CPT

## 2023-04-04 PROCEDURE — 84443 ASSAY THYROID STIM HORMONE: CPT

## 2023-04-20 ENCOUNTER — APPOINTMENT (OUTPATIENT)
Dept: MEDICAL GROUP | Facility: LAB | Age: 63
End: 2023-04-20
Payer: COMMERCIAL

## 2023-05-20 DIAGNOSIS — E03.9 ACQUIRED HYPOTHYROIDISM: ICD-10-CM

## 2023-05-22 RX ORDER — LEVOTHYROXINE SODIUM 0.05 MG/1
TABLET ORAL
Qty: 30 TABLET | Refills: 11 | Status: SHIPPED | OUTPATIENT
Start: 2023-05-22

## 2023-05-22 NOTE — TELEPHONE ENCOUNTER
Received request via: Pharmacy    Was the patient seen in the last year in this department? Yes  5/31/22 APPT [facility] 6/1/23  Does the patient have an active prescription (recently filled or refills available) for medication(s) requested? No    Does the patient have [facility] Plus and need 100 day supply (blood pressure, diabetes and cholesterol meds only)? Medication is not for cholesterol, blood pressure or diabetes

## 2023-06-01 ENCOUNTER — OFFICE VISIT (OUTPATIENT)
Dept: MEDICAL GROUP | Facility: LAB | Age: 63
End: 2023-06-01
Payer: COMMERCIAL

## 2023-06-01 VITALS
TEMPERATURE: 97.2 F | SYSTOLIC BLOOD PRESSURE: 118 MMHG | HEIGHT: 69 IN | RESPIRATION RATE: 12 BRPM | HEART RATE: 72 BPM | WEIGHT: 168.4 LBS | OXYGEN SATURATION: 97 % | DIASTOLIC BLOOD PRESSURE: 82 MMHG | BODY MASS INDEX: 24.94 KG/M2

## 2023-06-01 DIAGNOSIS — E03.9 ACQUIRED HYPOTHYROIDISM: ICD-10-CM

## 2023-06-01 DIAGNOSIS — Z00.00 WELL ADULT EXAM: ICD-10-CM

## 2023-06-01 DIAGNOSIS — E78.2 MIXED DYSLIPIDEMIA: ICD-10-CM

## 2023-06-01 PROCEDURE — 99396 PREV VISIT EST AGE 40-64: CPT | Performed by: FAMILY MEDICINE

## 2023-06-01 PROCEDURE — 3079F DIAST BP 80-89 MM HG: CPT | Performed by: FAMILY MEDICINE

## 2023-06-01 PROCEDURE — 3074F SYST BP LT 130 MM HG: CPT | Performed by: FAMILY MEDICINE

## 2023-06-01 RX ORDER — ATORVASTATIN CALCIUM 40 MG/1
40 TABLET, FILM COATED ORAL NIGHTLY
Qty: 30 TABLET | Refills: 11 | Status: SHIPPED | OUTPATIENT
Start: 2023-06-01

## 2023-06-01 ASSESSMENT — PATIENT HEALTH QUESTIONNAIRE - PHQ9: CLINICAL INTERPRETATION OF PHQ2 SCORE: 0

## 2023-06-01 ASSESSMENT — FIBROSIS 4 INDEX: FIB4 SCORE: 1.08

## 2023-06-01 NOTE — PROGRESS NOTES
Subjective:     CC:   Chief Complaint   Patient presents with    Annual Exam       HPI:   Laura Glass is a 62 y.o. female who presents for annual exam    Patient has GYN provider: Yes  Last Pap Smear: 2017 - plans to schedule pap   H/O Abnormal Pap: No  Last Mammogram: 2022  Last Bone Density Test: Not due  Last Colorectal Cancer Screenin  Last Tdap: 2018  Received HPV series: Aged out    Exercise: yes- regular exercise  Diet: overall healthy        OB History    Para Term  AB Living   4 0 0 0 2 2   SAB IAB Ectopic Molar Multiple Live Births   2 0 0 0 0 0      She  reports being sexually active and has had partner(s) who are male. She reports using the following method of birth control/protection: Post-Menopausal.    She  has a past medical history of Dyslipidemia (2012).    She has no past medical history of Breast cancer (HCC).  She  has a past surgical history that includes nasal polypectomy; primary c section; and eye surgery.    Family History   Problem Relation Age of Onset    Diabetes Mother     Lung Disease Mother     Hypertension Mother     Hyperlipidemia Mother     Other Father         Parkinson's    Diabetes Father     Diabetes Brother     Other Brother         Multiple MSK issues    Heart Disease Neg Hx     Heart Attack Neg Hx     Stroke Neg Hx      Social History     Tobacco Use    Smoking status: Never    Smokeless tobacco: Never   Vaping Use    Vaping Use: Never used   Substance Use Topics    Alcohol use: Not Currently     Alcohol/week: 8.4 oz     Types: 14 Shots of liquor per week     Comment: occ    Drug use: No       Patient Active Problem List    Diagnosis Date Noted    Acquired hypothyroidism 2018    Mixed dyslipidemia 2015    Hot flashes 2013     Current Outpatient Medications   Medication Sig Dispense Refill    levothyroxine (SYNTHROID) 50 MCG Tab TAKE 1 TABLET BY MOUTH EVERY DAY IN THE MORNING ON AN EMPTY STOMACH 30 Tablet 11     "pravastatin (PRAVACHOL) 80 MG tablet TAKE 1 TABLET BY MOUTH EVERY DAY 30 Tablet 11    albuterol 108 (90 Base) MCG/ACT Aero Soln inhalation aerosol Inhale 1-2 Puffs every four hours as needed for Shortness of Breath. 1 Each 0    Multiple Vitamins-Minerals (CENTRUM ULTRA WOMENS PO) Take  by mouth.      Cinnamon 500 MG Tab Take  by mouth.      Calcium 200 MG Tab Take  by mouth.      Cholecalciferol (VITAMIN D) 50 MCG (2000 UT) Cap Take  by mouth.      coenzyme Q-10 30 MG capsule Take 60 mg by mouth every day.      albuterol 108 (90 Base) MCG/ACT Aero Soln inhalation aerosol Inhale 1-2 Puffs every four hours as needed for Shortness of Breath. 1 Each 1     No current facility-administered medications for this visit.     No Known Allergies    Review of Systems   Constitutional: Negative for fever, chills and malaise/fatigue.   HENT: Negative for congestion.    Eyes: Negative for pain.   Respiratory: Negative for cough and shortness of breath.    Cardiovascular: Negative for chest pain and leg swelling.   Gastrointestinal: Negative for nausea, vomiting, abdominal pain and diarrhea.   Genitourinary: Negative for dysuria and hematuria.   Skin: Negative for rash.   Neurological: Negative for dizziness, focal weakness and headaches.   Endo/Heme/Allergies: Does not bruise/bleed easily.   Psychiatric/Behavioral: Negative for depression.  The patient is not nervous/anxious.      Objective:   /82 (BP Location: Right arm, Patient Position: Sitting, BP Cuff Size: Adult)   Pulse 72   Temp 36.2 °C (97.2 °F)   Resp 12   Ht 1.753 m (5' 9\")   Wt 76.4 kg (168 lb 6.4 oz)   SpO2 97%   BMI 24.87 kg/m²     Wt Readings from Last 4 Encounters:   06/01/23 76.4 kg (168 lb 6.4 oz)   05/31/22 78.5 kg (173 lb)   04/08/22 78 kg (172 lb)   04/06/22 77.1 kg (170 lb)     Physical Exam:  Constitutional: Well-developed and well-nourished. Not diaphoretic. No distress.   Skin: Skin is warm and dry. No rash noted.  Head: Atraumatic without " lesions.  Eyes: Conjunctivae and extraocular motions are normal. Pupils are equal, round, and reactive to light. No scleral icterus.   Ears:  External ears unremarkable.   Nose: Nares patent. Septum midline. Turbinates without erythema nor edema. No discharge.   Mouth/Throat: Tongue normal. Oropharynx is clear and moist. Posterior pharynx without erythema or exudates.  Neck: Supple, trachea midline.  Cardiovascular: Regular rate and rhythm, S1 and S2 without murmur, rubs, or gallops.    Extremities: No cyanosis, clubbing, erythema, nor edema. Distal pulses intact and symmetric.   Musculoskeletal: All major joints AROM full in all directions without pain.  Neurological: Alert and oriented x 3. Grossly non-focal. Strength and sensation grossly intact.   Psychiatric:  Behavior, mood, and affect are appropriate.    Assessment and Plan:     1. Well adult exam  Health maintenance reviewed and updated.  Age-appropriate anticipatory guidance provided.    2. Mixed dyslipidemia  LDL above goal, switch pravastatin to atorvastatin.  Recheck lipids in 3 months.  - atorvastatin (LIPITOR) 40 MG Tab; Take 1 Tablet by mouth every evening.  Dispense: 30 Tablet; Refill: 11  - Lipid Profile; Future    3. Acquired hypothyroidism  TSH in normal range.  Continue 50 mcg Synthroid daily.    Follow-up: Return in about 1 year (around 6/1/2024), or if symptoms worsen or fail to improve.

## 2023-08-29 ENCOUNTER — APPOINTMENT (OUTPATIENT)
Dept: RADIOLOGY | Facility: IMAGING CENTER | Age: 63
End: 2023-08-29
Payer: COMMERCIAL

## 2023-08-29 ENCOUNTER — APPOINTMENT (OUTPATIENT)
Dept: URGENT CARE | Facility: CLINIC | Age: 63
End: 2023-08-29
Payer: COMMERCIAL

## 2023-08-29 ENCOUNTER — OFFICE VISIT (OUTPATIENT)
Dept: URGENT CARE | Facility: CLINIC | Age: 63
End: 2023-08-29
Payer: COMMERCIAL

## 2023-08-29 VITALS
TEMPERATURE: 97.7 F | HEIGHT: 69 IN | RESPIRATION RATE: 16 BRPM | SYSTOLIC BLOOD PRESSURE: 122 MMHG | HEART RATE: 94 BPM | DIASTOLIC BLOOD PRESSURE: 78 MMHG | WEIGHT: 170 LBS | OXYGEN SATURATION: 96 % | BODY MASS INDEX: 25.18 KG/M2

## 2023-08-29 DIAGNOSIS — S22.42XA CLOSED FRACTURE OF MULTIPLE RIBS OF LEFT SIDE, INITIAL ENCOUNTER: ICD-10-CM

## 2023-08-29 DIAGNOSIS — R07.81 RIB PAIN ON LEFT SIDE: ICD-10-CM

## 2023-08-29 PROCEDURE — 3078F DIAST BP <80 MM HG: CPT

## 2023-08-29 PROCEDURE — 71101 X-RAY EXAM UNILAT RIBS/CHEST: CPT | Mod: TC,LT | Performed by: PHYSICIAN ASSISTANT

## 2023-08-29 PROCEDURE — 99213 OFFICE O/P EST LOW 20 MIN: CPT

## 2023-08-29 PROCEDURE — 3074F SYST BP LT 130 MM HG: CPT

## 2023-08-29 ASSESSMENT — FIBROSIS 4 INDEX: FIB4 SCORE: 1.08

## 2023-08-29 NOTE — PROGRESS NOTES
Subjective:   Laura Glass is a very pleasant 62 y.o. female who presents for:    Chief Complaint   Patient presents with    Rib Injury       HPI:    Rib Injury  This is a new problem. Episode onset: one week ago she was choking and the hemleich was done. She is having residual RS rib pain. Pertinent negatives include no chest pain. Associated symptoms comments: Pain with inspiration.  Denies shortness of breath, heart palpitations, orthopnea, cough, fever. The symptoms are aggravated by coughing, sneezing, standing and twisting. She has tried NSAIDs for the symptoms. The treatment provided moderate relief.       ROS:    Review of Systems   Cardiovascular:  Negative for chest pain.   Musculoskeletal:         LS rib pain   All other systems reviewed and are negative.      Medications:      Current Outpatient Medications   Medication Sig    atorvastatin (LIPITOR) 40 MG Tab Take 1 Tablet by mouth every evening.    levothyroxine (SYNTHROID) 50 MCG Tab TAKE 1 TABLET BY MOUTH EVERY DAY IN THE MORNING ON AN EMPTY STOMACH    albuterol 108 (90 Base) MCG/ACT Aero Soln inhalation aerosol Inhale 1-2 Puffs every four hours as needed for Shortness of Breath.    Multiple Vitamins-Minerals (CENTRUM ULTRA WOMENS PO) Take  by mouth.    Cinnamon 500 MG Tab Take  by mouth.    Calcium 200 MG Tab Take  by mouth.    Cholecalciferol (VITAMIN D) 50 MCG (2000 UT) Cap Take  by mouth.    coenzyme Q-10 30 MG capsule Take 60 mg by mouth every day.       Allergies:     No Known Allergies    Problem List:     Patient Active Problem List   Diagnosis    Hot flashes    Mixed dyslipidemia    Acquired hypothyroidism       Surgical History:    Past Surgical History:   Procedure Laterality Date    EYE SURGERY      bilateral upper eyelids     NASAL POLYPECTOMY      PRIMARY C SECTION         Past Social Hx:     Social History     Socioeconomic History    Marital status:     Highest education level: Some college, no degree   Tobacco Use     Smoking status: Never    Smokeless tobacco: Never   Vaping Use    Vaping Use: Never used   Substance and Sexual Activity    Alcohol use: Not Currently     Alcohol/week: 8.4 oz     Types: 14 Shots of liquor per week     Comment: occ    Drug use: No    Sexual activity: Yes     Partners: Male     Birth control/protection: Post-Menopausal     Social Determinants of Health     Financial Resource Strain: Low Risk  (4/4/2023)    Overall Financial Resource Strain (CARDIA)     Difficulty of Paying Living Expenses: Not hard at all   Food Insecurity: No Food Insecurity (4/4/2023)    Hunger Vital Sign     Worried About Running Out of Food in the Last Year: Never true     Ran Out of Food in the Last Year: Never true   Transportation Needs: No Transportation Needs (4/4/2023)    PRAPARE - Transportation     Lack of Transportation (Medical): No     Lack of Transportation (Non-Medical): No   Physical Activity: Sufficiently Active (4/4/2023)    Exercise Vital Sign     Days of Exercise per Week: 5 days     Minutes of Exercise per Session: 50 min   Stress: No Stress Concern Present (4/4/2023)    [place] [employer] - Occupational Stress Questionnaire     Feeling of Stress : Not at all   Social Connections: Moderately Isolated (4/4/2023)    Social Connection and Isolation Panel [NHANES]     Frequency of Communication with Friends and Family: More than three times a week     Frequency of Social Gatherings with Friends and Family: More than three times a week     Attends Sikh Services: Never     Active Member of Clubs or Organizations: No     Attends Club or Organization Meetings: Never     Marital Status:    Housing Stability: Low Risk  (4/4/2023)    Housing Stability Vital Sign     Unable to Pay for Housing in the Last Year: No     Number of Places Lived in the Last Year: 1     Unstable Housing in the Last Year: No        Past Family Hx:      Family History   Problem Relation Age of Onset    Diabetes  Mother     Lung Disease Mother     Hypertension Mother     Hyperlipidemia Mother     Other Father         Parkinson's    Diabetes Father     Diabetes Brother     Other Brother         Multiple MSK issues    Heart Disease Neg Hx     Heart Attack Neg Hx     Stroke Neg Hx        Problem list, medications, and allergies reviewed by myself today in Epic.     Objective:     Vitals:    08/29/23 1150   BP: 122/78   Pulse: 94   Resp: 16   Temp: 36.5 °C (97.7 °F)   SpO2: 96%       Physical Exam  Vitals reviewed.   Constitutional:       General: She is not in acute distress.     Appearance: Normal appearance. She is not ill-appearing, toxic-appearing or diaphoretic.   HENT:      Head: Normocephalic and atraumatic.      Right Ear: Tympanic membrane, ear canal and external ear normal.      Left Ear: Tympanic membrane, ear canal and external ear normal.      Nose: Nose normal.      Mouth/Throat:      Mouth: Mucous membranes are moist.      Pharynx: Oropharynx is clear.   Eyes:      Extraocular Movements: Extraocular movements intact.      Conjunctiva/sclera: Conjunctivae normal.      Pupils: Pupils are equal, round, and reactive to light.   Cardiovascular:      Rate and Rhythm: Normal rate and regular rhythm.      Pulses: Normal pulses.   Pulmonary:      Effort: Pulmonary effort is normal.   Musculoskeletal:         General: Normal range of motion.        Arms:       Cervical back: Normal range of motion.      Comments: Tenderness to L anterior aspect of lower ribs without overlying skin changes, deformity.  Pain reproducible with mild palpation.  Pain is localized to this area and does not radiate.   Skin:     General: Skin is warm and dry.   Neurological:      General: No focal deficit present.      Mental Status: She is alert and oriented to person, place, and time.   Psychiatric:         Mood and Affect: Mood normal.         Behavior: Behavior normal.         Thought Content: Thought content normal.     Patient denies  shortness of breath    X-rays were reviewed by APRN, confirmed by radiology:   RADIOLOGY RESULTS   [id number] (WITH 1-VIEW CXR) LEFT    Result Date: 8/29/2023 8/29/2023 12:25 PM HISTORY/REASON FOR EXAM:  Pain Following Trauma. TECHNIQUE/EXAM DESCRIPTION AND NUMBER OF VIEWS:  5 images of the left ribs and chest. COMPARISON: CT chest 4/19/2022 FINDINGS: Cardiomediastinal silhouette is stable. Right cardiophrenic angle lesion correlates to prominent epicardial fat seen on the prior CT No focal consolidation, pleural effusion, pulmonary edema or pneumothorax. There could be a nondisplaced left lateral 10th and questionable lesion of the 9th anterior rib. Nondisplaced rib fractures could be present and not visualized.     No acute cardiopulmonary abnormality. Probable acute left anterolateral 10th and probably ninth rib fractures.                 Assessment/Plan:     Diagnosis and associated orders:     1. Closed fracture of multiple ribs of left side, initial encounter  - [id number] (WITH 1-VIEW CXR) LEFT; Future        Comments/MDM:     X-ray demonstrates probable ninth and 10th rib fractures  Discussed supportive care for rib fracture, including the use of over-the-counter analgesics.  Tylenol/ibuprofen as needed for pain, warm compresses, gentle stretching, avoiding activities that may cause increased harm to the area  At the time of clinical examination, patient is not complaining of any systemic symptoms, shortness of breath, chest pain, increased work of breathing.  Pulmonary exam benign.  Follow-up with primary care provider in the next 3 to 4 weeks or sooner if symptoms fail to improve or worsen despite supportive measures         All questions answered. Patient verbalized understanding and is in agreement with this plan of care.     If symptoms are worsening or not improving in 3-5 days, follow-up with PCP or return to . Differential diagnosis, natural history, and supportive care discussed.  AVS handout given and reviewed with patient. Patient educated on red flags and when to seek treatment back in ED or UC.     I personally reviewed prior external notes and test results pertinent to today's visit.  I have independently reviewed and interpreted all diagnostics ordered during this urgent care visit.     This dictation has been created using voice recognition software. The accuracy of the dictation is limited by the abilities of the software. I expect there may be some errors of grammar and possibly content. I made every attempt to manually correct the errors within my dictation. However, errors related to voice recognition software may still exist and should be interpreted within the appropriate context.    This note was electronically signed by ELMER Brooks

## 2024-01-16 ENCOUNTER — TELEMEDICINE (OUTPATIENT)
Dept: MEDICAL GROUP | Facility: LAB | Age: 64
End: 2024-01-16
Payer: COMMERCIAL

## 2024-01-16 VITALS — HEIGHT: 69 IN | BODY MASS INDEX: 25.1 KG/M2

## 2024-01-16 DIAGNOSIS — U07.1 COVID-19: ICD-10-CM

## 2024-01-16 PROCEDURE — 99213 OFFICE O/P EST LOW 20 MIN: CPT | Performed by: PHYSICIAN ASSISTANT

## 2024-01-16 RX ORDER — ALBUTEROL SULFATE 90 UG/1
1-2 AEROSOL, METERED RESPIRATORY (INHALATION) EVERY 4 HOURS PRN
Qty: 1 EACH | Refills: 3 | Status: SHIPPED | OUTPATIENT
Start: 2024-01-16

## 2024-01-16 ASSESSMENT — PATIENT HEALTH QUESTIONNAIRE - PHQ9: CLINICAL INTERPRETATION OF PHQ2 SCORE: 0

## 2024-01-16 NOTE — PROGRESS NOTES
"This evaluation was conducted via Zoom using secure and encrypted videoconferencing technology. The patient was in their home in the state [facility].    The patient's identity was confirmed and verbal consent was obtained for this virtual visit.    Subjective:     CC: Covid-19    HPI:   Laura here today with     Exposure:unknown  Date of Exposure:unknown  Symptoms: started Saturday Evening, runny nose, congestion, headaches, chills, cough, fatigue    Positive test: 1/15/2024  Vaccinated? Due for # 3 covid vax    OTC treatments: cough syrup, decongestant, nasal spray    ROS:  All ROS negative except for pertinent positives listed above.     Current Outpatient Medications Ordered in Epic   Medication Sig Dispense Refill    albuterol 108 (90 Base) MCG/ACT Aero Soln inhalation aerosol Inhale 1-2 Puffs every four hours as needed for Shortness of Breath. 1 Each 3    Nirmatrelvir&Ritonavir 300/100 20 x 150 MG & 10 x 100MG Tablet Therapy Pack Take 300 mg nirmatrelvir (two 150 mg tablets) with 100 mg ritonavir (one 100 mg tablet) by mouth, with all three tablets taken together twice daily for 5 days. 30 Each 0    atorvastatin (LIPITOR) 40 MG Tab Take 1 Tablet by mouth every evening. 30 Tablet 11    levothyroxine (SYNTHROID) 50 MCG Tab TAKE 1 TABLET BY MOUTH EVERY DAY IN THE MORNING ON AN EMPTY STOMACH 30 Tablet 11    Multiple Vitamins-Minerals (CENTRUM ULTRA WOMENS PO) Take  by mouth.      Cinnamon 500 MG Tab Take  by mouth.      Calcium 200 MG Tab Take  by mouth.      Cholecalciferol (VITAMIN D) 50 MCG (2000 UT) Cap Take  by mouth.      coenzyme Q-10 30 MG capsule Take 60 mg by mouth every day.       No current [facility]-ordered facility-administered medications on file.         Objective:     Exam:  Ht 1.753 m (5' 9\")   BMI 25.10 kg/m²  Body mass index is 25.1 kg/m².    Constitutional: Alert, no distress, well-groomed.  Skin: Warm, dry, good turgor, no rashes in visible areas.  Eye: Equal, round and reactive, conjunctiva " clear, lids normal.  ENMT: Lips without lesions, good dentition, moist mucous membranes.  Neck: Trachea midline, no masses, no thyromegaly.  Respiratory: Unlabored respiratory effort, + cough.  MSK: Normal gait, moves all extremities.  Neuro: Grossly non-focal.   Psych: Alert and oriented x3, normal affect and mood.          Assessment & Plan:     63 y.o. female with the following -     1. COVID-19  Counseling/Patient Education:--   Symptomatic: Isolation until 10 days from symptom onset AND 24 hours after resolution of fever (without antipyretic) AND improvement of symptoms; close contacts should also quarantine x 10 days  Reviewed expected course/prognosis of COVID-19-- Advised wearing a mask in public-- Encouraged hand hygiene  Continue OTC treatments   Discussed risks and benefits of Paxlovid with patient.  She understands if she does choose to utilize this medication she will have to hold her Lipitor while taking it  Red Flags discussed.   - albuterol 108 (90 Base) MCG/ACT Aero Soln inhalation aerosol; Inhale 1-2 Puffs every four hours as needed for Shortness of Breath.  Dispense: 1 Each; Refill: 3  - Nirmatrelvir&Ritonavir 300/100 20 x 150 MG & 10 x 100MG Tablet Therapy Pack; Take 300 mg nirmatrelvir (two 150 mg tablets) with 100 mg ritonavir (one 100 mg tablet) by mouth, with all three tablets taken together twice daily for 5 days.  Dispense: 30 Each; Refill: 0            I spent a total of 15 minutes with record review, exam, communication with the patient, communication with other providers, and documentation of this encounter.      No follow-ups on file.    Please note that this dictation was created using voice recognition software. I have made every reasonable attempt to correct obvious errors, but there may be errors of grammar and possibly content that I did not discover before finalizing the note.

## 2024-03-01 ENCOUNTER — PATIENT MESSAGE (OUTPATIENT)
Dept: HEALTH INFORMATION MANAGEMENT | Facility: OTHER | Age: 64
End: 2024-03-01

## 2024-05-21 DIAGNOSIS — E78.2 MIXED DYSLIPIDEMIA: ICD-10-CM

## 2024-05-21 DIAGNOSIS — E03.9 ACQUIRED HYPOTHYROIDISM: ICD-10-CM

## 2024-05-21 RX ORDER — ATORVASTATIN CALCIUM 40 MG/1
40 TABLET, FILM COATED ORAL EVERY EVENING
Qty: 90 TABLET | Refills: 0 | Status: SHIPPED | OUTPATIENT
Start: 2024-05-21

## 2024-05-21 RX ORDER — LEVOTHYROXINE SODIUM 0.05 MG/1
TABLET ORAL
Qty: 90 TABLET | Refills: 3 | Status: SHIPPED | OUTPATIENT
Start: 2024-05-21

## 2024-05-21 NOTE — TELEPHONE ENCOUNTER
Received request via: Pharmacy    Was the patient seen in the last year in this department? Yes  6/1/23  Does the patient have an active prescription (recently filled or refills available) for medication(s) requested? No    Pharm[facility] Name: [facility]    Does the patient have USP Plus and need 100 day supply (blood pressure, diabetes and cholesterol meds only)? Patient does not have SCP

## 2024-05-24 ENCOUNTER — APPOINTMENT (OUTPATIENT)
Dept: MEDICAL GROUP | Facility: LAB | Age: 64
End: 2024-05-24
Payer: COMMERCIAL

## 2024-05-28 ENCOUNTER — APPOINTMENT (OUTPATIENT)
Dept: MEDICAL GROUP | Facility: LAB | Age: 64
End: 2024-05-28
Payer: COMMERCIAL

## 2024-05-30 ENCOUNTER — HOSPITAL ENCOUNTER (OUTPATIENT)
Dept: LAB | Facility: MEDICAL CENTER | Age: 64
End: 2024-05-30
Attending: FAMILY MEDICINE
Payer: COMMERCIAL

## 2024-05-30 DIAGNOSIS — E78.2 MIXED DYSLIPIDEMIA: ICD-10-CM

## 2024-05-30 LAB
CHOLEST SERPL-MCNC: 234 MG/DL (ref 100–199)
FASTING STATUS PATIENT QL REPORTED: NORMAL
HDLC SERPL-MCNC: 57 MG/DL
LDLC SERPL CALC-MCNC: 123 MG/DL
TRIGL SERPL-MCNC: 270 MG/DL (ref 0–149)

## 2024-06-27 ENCOUNTER — OFFICE VISIT (OUTPATIENT)
Dept: MEDICAL GROUP | Facility: LAB | Age: 64
End: 2024-06-27
Payer: COMMERCIAL

## 2024-06-27 VITALS
DIASTOLIC BLOOD PRESSURE: 72 MMHG | SYSTOLIC BLOOD PRESSURE: 116 MMHG | TEMPERATURE: 98.2 F | OXYGEN SATURATION: 94 % | HEIGHT: 68 IN | BODY MASS INDEX: 25.76 KG/M2 | RESPIRATION RATE: 18 BRPM | WEIGHT: 170 LBS | HEART RATE: 84 BPM

## 2024-06-27 DIAGNOSIS — Z78.0 POST-MENOPAUSAL: ICD-10-CM

## 2024-06-27 DIAGNOSIS — Z00.00 WELL ADULT EXAM: ICD-10-CM

## 2024-06-27 DIAGNOSIS — E78.2 MIXED DYSLIPIDEMIA: ICD-10-CM

## 2024-06-27 DIAGNOSIS — Z12.31 ENCOUNTER FOR SCREENING MAMMOGRAM FOR MALIGNANT NEOPLASM OF BREAST: ICD-10-CM

## 2024-06-27 PROCEDURE — 3074F SYST BP LT 130 MM HG: CPT | Performed by: FAMILY MEDICINE

## 2024-06-27 PROCEDURE — 3078F DIAST BP <80 MM HG: CPT | Performed by: FAMILY MEDICINE

## 2024-06-27 PROCEDURE — 99396 PREV VISIT EST AGE 40-64: CPT | Performed by: FAMILY MEDICINE

## 2024-06-27 RX ORDER — ATORVASTATIN CALCIUM 80 MG/1
80 TABLET, FILM COATED ORAL EVERY EVENING
Qty: 90 TABLET | Refills: 3 | Status: SHIPPED | OUTPATIENT
Start: 2024-06-27

## 2024-06-27 ASSESSMENT — FIBROSIS 4 INDEX: FIB4 SCORE: 1.1

## 2024-06-27 NOTE — PROGRESS NOTES
Subjective:     CC: Annual      HPI:   Laura Glass is a 63 y.o. female who presents for annual exam    Patient has GYN provider: No   Last Pap Smear: 2017 -declines repeat for now.  She may consider doing one more Pap.  H/O Abnormal Pap: No  Last Mammogram:   Last Bone Density Test: ordered  Last Colorectal Cancer Screenin - repeat set up  Last Tdap: 2018  Received HPV series: Aged out    Exercise: yes - regular - circuit training, pilates, walking  Diet: Less than ideal over the last few months with social events but planning to improve.      OB History    Para Term  AB Living   4 0 0 0 2 2   SAB IAB Ectopic Molar Multiple Live Births   2 0 0 0 0 0      She  reports being sexually active and has had partner(s) who are male. She reports using the following method of birth control/protection: Post-Menopausal.    She  has a past medical history of Arthritis, Dyslipidemia (2012), and Thyroid disease.    She has no past medical history of Breast cancer (HCC).  She  has a past surgical history that includes nasal polypectomy; primary c section; and eye surgery.    Family History   Problem Relation Age of Onset    Diabetes Mother     Lung Disease Mother     Hypertension Mother     Hyperlipidemia Mother     Other Father         Parkinson's    Diabetes Father     Lung Disease Father         Parkinson's , High Cholesterol, Vertigo    Diabetes Brother     Other Brother         Multiple MSK issues    Heart Disease Neg Hx     Heart Attack Neg Hx     Stroke Neg Hx      Social History     Tobacco Use    Smoking status: Never    Smokeless tobacco: Never   Vaping Use    Vaping status: Never Used   Substance Use Topics    Alcohol use: Yes     Alcohol/week: 8.4 oz     Types: 14 Shots of liquor per week     Comment: occ    Drug use: No       Patient Active Problem List    Diagnosis Date Noted    Acquired hypothyroidism 2018    Mixed dyslipidemia 2015    Hot flashes 2013  "    Current Outpatient Medications   Medication Sig Dispense Refill    levothyroxine (SYNTHROID) 50 MCG Tab TAKE 1 TABLET BY MOUTH EVERY DAY IN THE MORNING ON AN EMPTY STOMACH 90 Tablet 3    atorvastatin (LIPITOR) 40 MG Tab TAKE 1 TABLET BY MOUTH EVERY DAY IN THE EVENING 90 Tablet 0    albuterol 108 (90 Base) MCG/ACT Aero Soln inhalation aerosol Inhale 1-2 Puffs every four hours as needed for Shortness of Breath. 1 Each 3    Multiple Vitamins-Minerals (CENTRUM ULTRA WOMENS PO) Take  by mouth.      Cinnamon 500 MG Tab Take  by mouth.      Calcium 200 MG Tab Take  by mouth.      Cholecalciferol (VITAMIN D) 50 MCG (2000 UT) Cap Take  by mouth.      coenzyme Q-10 30 MG capsule Take 60 mg by mouth every day.      Nirmatrelvir&Ritonavir 300/100 20 x 150 MG & 10 x 100MG Tablet Therapy Pack Take 300 mg nirmatrelvir (two 150 mg tablets) with 100 mg ritonavir (one 100 mg tablet) by mouth, with all three tablets taken together twice daily for 5 days. 30 Each 0     No current facility-administered medications for this visit.     No Known Allergies    Review of Systems   Constitutional: Negative for fever, chills and malaise/fatigue.   HENT: Negative for congestion.    Eyes: Negative for pain.   Respiratory: Negative for cough and shortness of breath.    Cardiovascular: Negative for chest pain and leg swelling.   Gastrointestinal: Negative for nausea, vomiting, abdominal pain and diarrhea.   Genitourinary: Negative for dysuria and hematuria.   Skin: Negative for rash.   Neurological: Negative for dizziness, focal weakness and headaches.   Endo/Heme/Allergies: Does not bruise/bleed easily.   Psychiatric/Behavioral: Negative for depression.  The patient is not nervous/anxious.      Objective:   /72   Pulse 84   Temp 36.8 °C (98.2 °F) (Temporal)   Resp 18   Ht 1.727 m (5' 8\")   Wt 77.1 kg (170 lb)   SpO2 94%   BMI 25.85 kg/m²     Wt Readings from Last 4 Encounters:   06/27/24 77.1 kg (170 lb)   08/29/23 77.1 kg (170 " lb)   06/01/23 76.4 kg (168 lb 6.4 oz)   05/31/22 78.5 kg (173 lb)       Physical Exam:  Constitutional: Well-developed and well-nourished. Not diaphoretic. No distress.   Skin: Skin is warm and dry. No rash noted.  Head: Atraumatic without lesions.  Eyes: Conjunctivae and extraocular motions are normal. Pupils are equal, round, and reactive to light. No scleral icterus.   Ears:  External ears unremarkable.   Nose: Nares patent. Septum midline.   Mouth/Throat: Tongue normal. Oropharynx is clear and moist. Posterior pharynx without erythema or exudates.  Neck: Supple, trachea midline.   Cardiovascular: Regular rate and rhythm, S1 and S2 without murmur, rubs, or gallops.    Respiratory: Effort normal. Clear to auscultation throughout. No adventitious sounds.   Extremities: No cyanosis, clubbing, erythema, nor edema. Distal pulses intact and symmetric.   Musculoskeletal: All major joints AROM full in all directions without pain.  Neurological: Alert and oriented x 3. Grossly non-focal.   Psychiatric:  Behavior, mood, and affect are appropriate.    Assessment and Plan:     1. Well adult exam  Health maintenance reviewed and updated.  Age-appropriate anticipatory guidance provided.  - Comp Metabolic Panel; Future  - CBC WITH DIFFERENTIAL; Future  - TSH WITH REFLEX TO FT4; Future  - Lipid Profile; Future    2. Mixed dyslipidemia  LDL above goal.  Continue with lifestyle measures but will also increase Lipitor to 80 mg daily.  Recheck lipids in 3 months as above.  - atorvastatin (LIPITOR) 80 MG tablet; Take 1 Tablet by mouth every evening.  Dispense: 90 Tablet; Refill: 3    3. Encounter for screening mammogram for malignant neoplasm of breast  - MA-SCREENING MAMMO BILAT W/TOMOSYNTHESIS W/CAD; Future    4. Post-menopausal  - DS-BONE DENSITY STUDY (DEXA); Future    Follow-up: Return in about 1 year (around 6/27/2025), or if symptoms worsen or fail to improve.

## 2025-01-13 ENCOUNTER — TELEPHONE (OUTPATIENT)
Dept: HEALTH INFORMATION MANAGEMENT | Facility: OTHER | Age: 65
End: 2025-01-13
Payer: COMMERCIAL

## 2025-01-14 ENCOUNTER — HOSPITAL ENCOUNTER (OUTPATIENT)
Dept: RADIOLOGY | Facility: MEDICAL CENTER | Age: 65
End: 2025-01-14
Attending: FAMILY MEDICINE
Payer: COMMERCIAL

## 2025-01-14 DIAGNOSIS — Z12.31 ENCOUNTER FOR SCREENING MAMMOGRAM FOR MALIGNANT NEOPLASM OF BREAST: ICD-10-CM

## 2025-01-14 PROCEDURE — 77067 SCR MAMMO BI INCL CAD: CPT

## 2025-02-27 ENCOUNTER — HOSPITAL ENCOUNTER (OUTPATIENT)
Dept: RADIOLOGY | Facility: MEDICAL CENTER | Age: 65
End: 2025-02-27
Attending: FAMILY MEDICINE
Payer: MEDICARE

## 2025-02-27 DIAGNOSIS — Z78.0 POST-MENOPAUSAL: ICD-10-CM

## 2025-02-27 PROCEDURE — 77080 DXA BONE DENSITY AXIAL: CPT

## 2025-02-28 ENCOUNTER — RESULTS FOLLOW-UP (OUTPATIENT)
Dept: MEDICAL GROUP | Facility: LAB | Age: 65
End: 2025-02-28

## 2025-05-08 ENCOUNTER — HOSPITAL ENCOUNTER (OUTPATIENT)
Dept: LAB | Facility: MEDICAL CENTER | Age: 65
End: 2025-05-08
Attending: FAMILY MEDICINE
Payer: MEDICARE

## 2025-05-08 DIAGNOSIS — Z00.00 WELL ADULT EXAM: ICD-10-CM

## 2025-05-08 LAB
ALBUMIN SERPL BCP-MCNC: 4.3 G/DL (ref 3.2–4.9)
ALBUMIN/GLOB SERPL: 1.4 G/DL
ALP SERPL-CCNC: 74 U/L (ref 30–99)
ALT SERPL-CCNC: 24 U/L (ref 2–50)
ANION GAP SERPL CALC-SCNC: 12 MMOL/L (ref 7–16)
AST SERPL-CCNC: 26 U/L (ref 12–45)
BASOPHILS # BLD AUTO: 0.9 % (ref 0–1.8)
BASOPHILS # BLD: 0.05 K/UL (ref 0–0.12)
BILIRUB SERPL-MCNC: 0.5 MG/DL (ref 0.1–1.5)
BUN SERPL-MCNC: 12 MG/DL (ref 8–22)
CALCIUM ALBUM COR SERPL-MCNC: 9.2 MG/DL (ref 8.5–10.5)
CALCIUM SERPL-MCNC: 9.4 MG/DL (ref 8.5–10.5)
CHLORIDE SERPL-SCNC: 102 MMOL/L (ref 96–112)
CHOLEST SERPL-MCNC: 190 MG/DL (ref 100–199)
CO2 SERPL-SCNC: 24 MMOL/L (ref 20–33)
CREAT SERPL-MCNC: 0.9 MG/DL (ref 0.5–1.4)
EOSINOPHIL # BLD AUTO: 0.13 K/UL (ref 0–0.51)
EOSINOPHIL NFR BLD: 2.2 % (ref 0–6.9)
ERYTHROCYTE [DISTWIDTH] IN BLOOD BY AUTOMATED COUNT: 46.8 FL (ref 35.9–50)
GFR SERPLBLD CREATININE-BSD FMLA CKD-EPI: 71 ML/MIN/1.73 M 2
GLOBULIN SER CALC-MCNC: 3 G/DL (ref 1.9–3.5)
GLUCOSE SERPL-MCNC: 95 MG/DL (ref 65–99)
HCT VFR BLD AUTO: 44.8 % (ref 37–47)
HDLC SERPL-MCNC: 52 MG/DL
HGB BLD-MCNC: 14.4 G/DL (ref 12–16)
IMM GRANULOCYTES # BLD AUTO: 0.02 K/UL (ref 0–0.11)
IMM GRANULOCYTES NFR BLD AUTO: 0.3 % (ref 0–0.9)
LDLC SERPL CALC-MCNC: 94 MG/DL
LYMPHOCYTES # BLD AUTO: 2.3 K/UL (ref 1–4.8)
LYMPHOCYTES NFR BLD: 39.6 % (ref 22–41)
MCH RBC QN AUTO: 30.2 PG (ref 27–33)
MCHC RBC AUTO-ENTMCNC: 32.1 G/DL (ref 32.2–35.5)
MCV RBC AUTO: 93.9 FL (ref 81.4–97.8)
MONOCYTES # BLD AUTO: 0.4 K/UL (ref 0–0.85)
MONOCYTES NFR BLD AUTO: 6.9 % (ref 0–13.4)
NEUTROPHILS # BLD AUTO: 2.91 K/UL (ref 1.82–7.42)
NEUTROPHILS NFR BLD: 50.1 % (ref 44–72)
NRBC # BLD AUTO: 0 K/UL
NRBC BLD-RTO: 0 /100 WBC (ref 0–0.2)
PLATELET # BLD AUTO: 285 K/UL (ref 164–446)
PMV BLD AUTO: 9.9 FL (ref 9–12.9)
POTASSIUM SERPL-SCNC: 4.2 MMOL/L (ref 3.6–5.5)
PROT SERPL-MCNC: 7.3 G/DL (ref 6–8.2)
RBC # BLD AUTO: 4.77 M/UL (ref 4.2–5.4)
SODIUM SERPL-SCNC: 138 MMOL/L (ref 135–145)
TRIGL SERPL-MCNC: 222 MG/DL (ref 0–149)
TSH SERPL DL<=0.005 MIU/L-ACNC: 2.26 UIU/ML (ref 0.38–5.33)
WBC # BLD AUTO: 5.8 K/UL (ref 4.8–10.8)

## 2025-05-08 PROCEDURE — 80061 LIPID PANEL: CPT | Mod: GY

## 2025-05-08 PROCEDURE — 85025 COMPLETE CBC W/AUTO DIFF WBC: CPT | Mod: GY

## 2025-05-08 PROCEDURE — 80053 COMPREHEN METABOLIC PANEL: CPT | Mod: GY

## 2025-05-08 PROCEDURE — 84443 ASSAY THYROID STIM HORMONE: CPT | Mod: GY

## 2025-05-08 PROCEDURE — 36415 COLL VENOUS BLD VENIPUNCTURE: CPT

## 2025-05-09 ENCOUNTER — RESULTS FOLLOW-UP (OUTPATIENT)
Dept: MEDICAL GROUP | Facility: LAB | Age: 65
End: 2025-05-09
Payer: MEDICARE

## 2025-05-14 ENCOUNTER — OFFICE VISIT (OUTPATIENT)
Dept: MEDICAL GROUP | Facility: LAB | Age: 65
End: 2025-05-14
Payer: MEDICARE

## 2025-05-14 VITALS
HEIGHT: 68 IN | HEART RATE: 74 BPM | DIASTOLIC BLOOD PRESSURE: 70 MMHG | TEMPERATURE: 98 F | RESPIRATION RATE: 16 BRPM | OXYGEN SATURATION: 95 % | BODY MASS INDEX: 25.76 KG/M2 | WEIGHT: 170 LBS | SYSTOLIC BLOOD PRESSURE: 112 MMHG

## 2025-05-14 DIAGNOSIS — Z00.00 MEDICARE ANNUAL WELLNESS VISIT, INITIAL: Primary | ICD-10-CM

## 2025-05-14 DIAGNOSIS — J98.01 BRONCHOSPASM: ICD-10-CM

## 2025-05-14 DIAGNOSIS — K63.5 POLYP OF COLON, UNSPECIFIED PART OF COLON, UNSPECIFIED TYPE: ICD-10-CM

## 2025-05-14 DIAGNOSIS — E03.9 ACQUIRED HYPOTHYROIDISM: ICD-10-CM

## 2025-05-14 DIAGNOSIS — E78.2 MIXED DYSLIPIDEMIA: ICD-10-CM

## 2025-05-14 DIAGNOSIS — Z12.11 SCREEN FOR COLON CANCER: ICD-10-CM

## 2025-05-14 DIAGNOSIS — Z23 NEED FOR VACCINATION: ICD-10-CM

## 2025-05-14 PROCEDURE — 90677 PCV20 VACCINE IM: CPT | Performed by: FAMILY MEDICINE

## 2025-05-14 PROCEDURE — G0009 ADMIN PNEUMOCOCCAL VACCINE: HCPCS | Performed by: FAMILY MEDICINE

## 2025-05-14 PROCEDURE — 3074F SYST BP LT 130 MM HG: CPT | Performed by: FAMILY MEDICINE

## 2025-05-14 PROCEDURE — G0402 INITIAL PREVENTIVE EXAM: HCPCS | Mod: 25 | Performed by: FAMILY MEDICINE

## 2025-05-14 PROCEDURE — 3078F DIAST BP <80 MM HG: CPT | Performed by: FAMILY MEDICINE

## 2025-05-14 RX ORDER — ATORVASTATIN CALCIUM 80 MG/1
80 TABLET, FILM COATED ORAL EVERY EVENING
Qty: 90 TABLET | Refills: 3 | Status: SHIPPED | OUTPATIENT
Start: 2025-05-14

## 2025-05-14 RX ORDER — ALBUTEROL SULFATE 90 UG/1
1-2 INHALANT RESPIRATORY (INHALATION) EVERY 4 HOURS PRN
Qty: 1 EACH | Refills: 3 | Status: SHIPPED | OUTPATIENT
Start: 2025-05-14

## 2025-05-14 RX ORDER — LEVOTHYROXINE SODIUM 50 UG/1
50 TABLET ORAL
Qty: 90 TABLET | Refills: 3 | Status: SHIPPED | OUTPATIENT
Start: 2025-05-14

## 2025-05-14 ASSESSMENT — PATIENT HEALTH QUESTIONNAIRE - PHQ9: CLINICAL INTERPRETATION OF PHQ2 SCORE: 0

## 2025-05-14 ASSESSMENT — FIBROSIS 4 INDEX: FIB4 SCORE: 1.21

## 2025-05-14 ASSESSMENT — ENCOUNTER SYMPTOMS: GENERAL WELL-BEING: GOOD

## 2025-05-14 ASSESSMENT — ACTIVITIES OF DAILY LIVING (ADL): BATHING_REQUIRES_ASSISTANCE: 0

## 2025-05-14 NOTE — PROGRESS NOTES
Chief Complaint   Patient presents with    Medicare Annual Wellness       HPI:  Laura Glass is a 64 y.o. here for Medicare Annual Wellness Visit     Patient Active Problem List    Diagnosis Date Noted    Acquired hypothyroidism 03/07/2018    Mixed dyslipidemia 11/20/2015    Hot flashes 09/25/2013       Current Medications[1]       Current supplements as per medication list.     Allergies: Patient has no known allergies.    Current social contact/activities: gym workouts, spend time with family and friends, yard work      She  reports that she has never smoked. She has never used smokeless tobacco. She reports current alcohol use of about 8.4 oz of alcohol per week. She reports that she does not use drugs.  Counseling given: Not Answered      ROS:    Gait: Uses no assistive device  Ostomy: No  Other tubes: No  Amputations: No  Chronic oxygen use: No  Last eye exam: 05/2024  Wears hearing aids: No   : Denies any urinary leakage during the last 6 months    Screening:    Depression Screening  Little interest or pleasure in doing things?  0 - not at all  Feeling down, depressed , or hopeless? 0 - not at all  Patient Health Questionnaire Score: 0     If depressive symptoms identified deferred to follow up visit unless specifically addressed in assessment and plan.    Interpretation of PHQ-9 Total Score   Score Severity   1-4 No Depression   5-9 Mild Depression   10-14 Moderate Depression   15-19 Moderately Severe Depression   20-27 Severe Depression    Screening for Cognitive Impairment  Do you or any of your friends or family members have any concern about your memory? No  Three Minute Recall (Village, Kitchen, Baby) 3/3    Ezio clock face with all 12 numbers and set the hands to show 10 minutes past 11.  Yes    Cognitive concerns identified deferred for follow up unless specifically addressed in assessment and plan.    Fall Risk Assessment  Has the patient had two or more falls in the last year or any fall  with injury in the last year?  No    Safety Assessment  Do you always wear your seatbelt?  Yes  Any changes to home needed to function safely? No  Difficulty hearing.  No  Patient counseled about all safety risks that were identified.    Functional Assessment ADLs  Are there any barriers preventing you from cooking for yourself or meeting nutritional needs?  No.    Are there any barriers preventing you from driving safely or obtaining transportation?  No.    Are there any barriers preventing you from using a telephone or calling for help?  No    Are there any barriers preventing you from shopping?  No.    Are there any barriers preventing you from taking care of your own finances?  No    Are there any barriers preventing you from managing your medications?  No    Are there any barriers preventing you from showering, bathing or dressing yourself? No    Are there any barriers preventing you from doing housework or laundry? No  Are there any barriers preventing you from using the toilet?No  Are you currently engaging in any exercise or physical activity?  Yes.      Self-Assessment of Health  What is your perception of your health? Good    Do you sleep more than six hours a night? Yes    In the past 7 days, how much did pain keep you from doing your normal work? None    Do you spend quality time with family or friends (virtually or in person)? Yes    Do you usually eat a heart healthy diet that constists of a variety of fruits, vegetables, whole grains and fiber? Yes    Do you eat foods high in fat and/or Fast Food more than three times per week? No    How concerned are you that your medical conditions are not being well managed? a little    Are you worried that in the next 2 months, you may not have stable housing that you own, rent, or stay in as part of a household? No      Advance Care Planning  Do you have an Advance Directive, Living Will, Durable Power of , or POLST? Yes  Advance Directive       is on  file      Health Maintenance Summary            Current Care Gaps       Cervical Cancer Screening (Every 5 Years) Overdue since 7/3/2022      07/03/2017  PAP IG (IMAGE GUIDED)    06/20/2013  Previously completed    09/25/2011  Done - clancy              Colorectal Cancer Screening (Colonoscopy - Every 8 Years) Overdue since 3/10/2024      03/10/2016  AMB REFERRAL TO GI FOR COLONOSCOPY              COVID-19 Vaccine (3 - 2024-25 season) Overdue since 9/1/2024 04/16/2021  Imm Admin: PFIZER PURPLE CAP SARS-COV-2 VACCINATION (12+)    03/23/2021  Imm Admin: PFIZER PURPLE CAP SARS-COV-2 VACCINATION (12+)              HIV Screening (Once) Never done     No completion history exists for this topic.              Pneumococcal Vaccine: 50+ Years (1 of 1 - PCV) Never done     No completion history exists for this topic.                      Needs Review       Hepatitis C Screening  Tentatively Complete      12/15/2016  Hepatitis C Antibody component of HEPATITIS PANEL ACUTE(4 COMPONENTS)              Mammogram (Yearly) Tentatively due on 1/14/2026 01/14/2025  MA-SCREENING MAMMO BILAT W/TOMOSYNTHESIS W/CAD    11/08/2022  MA-SCREENING MAMMO BILAT W/TOMOSYNTHESIS W/CAD    05/04/2021  MA-SCREENING MAMMO BILAT W/TOMOSYNTHESIS W/CAD    04/24/2019  MA-SCREENING MAMMO BILAT W/TOMOSYNTHESIS W/CAD    07/21/2017  MA-MAMMO SCREENING BILAT W/JAYA W/CAD     Only the first 5 history entries have been loaded, but more history exists.                    Upcoming       Influenza Vaccine (Season Ended) Next due on 9/1/2025 01/14/2013  Imm Admin: INFLUENZA TIV (IM)              Annual Wellness Visit (Yearly) Next due on 5/14/2026 05/14/2025  Visit Dx: Medicare annual wellness visit, initial              IMM DTaP/Tdap/Td Vaccine (2 - Td or Tdap) Next due on 4/20/2028 04/20/2018  Imm Admin: Tdap Vaccine              Bone Density Scan (Every 5 Years) Next due on 2/27/2030 02/27/2025  DS-BONE DENSITY STUDY (DEXA)                       Completed or No Longer Recommended       Hepatitis B Vaccine (Hep B) (Series Information) Completed      09/16/2020  Imm Admin: Hep A/HEP B Combined Vaccine (TwinRix)    04/15/2020  Imm Admin: Hep A/HEP B Combined Vaccine (TwinRix)    03/11/2020  Imm Admin: Hep A/HEP B Combined Vaccine (TwinRix)              Zoster (Shingles) Vaccines (Series Information) Completed      08/25/2020  Imm Admin: Zoster Vaccine Recombinant (RZV) (SHINGRIX)    12/16/2019  Imm Admin: Zoster Vaccine Recombinant (RZV) (SHINGRIX)              Hepatitis A Vaccine (Hep A) (Series Information) Aged Out      09/16/2020  Imm Admin: Hep A/HEP B Combined Vaccine (TwinRix)    04/15/2020  Imm Admin: Hep A/HEP B Combined Vaccine (TwinRix)    03/11/2020  Imm Admin: Hep A/HEP B Combined Vaccine (TwinRix)              HPV Vaccines (Series Information) Aged Out      No completion history exists for this topic.              Polio Vaccine (Inactivated Polio) (Series Information) Aged Out     No completion history exists for this topic.              Meningococcal Immunization (Series Information) Aged Out      03/11/2020  Imm Admin: Meningococcal Conjugate Vaccine MCV4 (Menactra)              Meningococcal B Vaccine (Series Information) Aged Out     No completion history exists for this topic.                            Patient Care Team:  Felipe Ambriz M.D. as PCP - General (Family Medicine)  Blade Reyes M.D. (Inactive) as Consulting Physician (Obstetrics & Gynecology)  Makenzie Starr D.C. as Consulting Physician (Chiropractic)        Social History[2]  Family History   Problem Relation Age of Onset    Diabetes Mother     Lung Disease Mother     Hypertension Mother     Hyperlipidemia Mother     Other Father         Parkinson's    Diabetes Father     Lung Disease Father         Parkinson's , High Cholesterol, Vertigo    Diabetes Brother     Other Brother         Multiple MSK issues    Heart Disease Neg Hx     Heart Attack  "Neg Hx     Stroke Neg Hx      She  has a past medical history of Arthritis, Dyslipidemia (07/16/2012), and Thyroid disease.    She has no past medical history of Breast cancer (HCC).   Past Surgical History[3]    Exam:   /70   Pulse 74   Temp 36.7 °C (98 °F)   Resp 16   Ht 1.727 m (5' 8\")   Wt 77.1 kg (170 lb)   SpO2 95%  Body mass index is 25.85 kg/m².    Hearing good.    Dentition good  Alert, oriented in no acute distress.  Eye contact is good, speech goal directed, affect calm  CV: RRR  Lungs: CTAB  Ext: No edema    Assessment and Plan. The following treatment and monitoring plan is recommended:      1. Medicare annual wellness visit, initial    2. Acquired hypothyroidism  TSH normal.  Continue levothyroxine 50 mcg daily.  - Basic Metabolic Panel; Future  - levothyroxine (SYNTHROID) 50 MCG Tab; Take 1 Tablet by mouth every morning on an empty stomach.  Dispense: 90 Tablet; Refill: 3    3. Bronchospasm  Rare/occasional albuterol use with respiratory infections.  - albuterol 108 (90 Base) MCG/ACT Aero Soln inhalation aerosol; Inhale 1-2 Puffs every four hours as needed for Shortness of Breath.  Dispense: 1 Each; Refill: 3    4. Mixed dyslipidemia  LDL at goal, continue atorvastatin 80 mg daily.  - Basic Metabolic Panel; Future  - atorvastatin (LIPITOR) 80 MG tablet; Take 1 Tablet by mouth every evening.  Dispense: 90 Tablet; Refill: 3    5. Screen for colon cancer  6. Polyp of colon, unspecified part of colon, unspecified type  [facility]  Reports polyps were found with repeat recommended in 3 years.    7. Need for vaccination  - Pneumococcal Conjugate Vaccine 20-Valent    Services suggested: No services needed at this time  Health Care Screening: Age-appropriate preventive services recommended by USPTF and ACIP covered by Medicare were discussed today. Services ordered if indicated and agreed upon by the patient.  Referrals offered: " Community-based lifestyle interventions to reduce health risks and promote self-management and wellness, fall prevention, nutrition, physical activity, tobacco-use cessation, weight loss, and mental health services as per orders if indicated.    Discussion today about general wellness and lifestyle habits:    Prevent falls and reduce trip hazards; Cautioned about securing or removing rugs.  Have a working fire alarm and carbon monoxide detector;   Engage in regular physical activity and social activities     Follow-up: Return in about 1 year (around 5/14/2026).         [1]   Current Outpatient Medications   Medication Sig Dispense Refill    atorvastatin (LIPITOR) 80 MG tablet Take 1 Tablet by mouth every evening. 90 Tablet 3    levothyroxine (SYNTHROID) 50 MCG Tab TAKE 1 TABLET BY MOUTH EVERY DAY IN THE MORNING ON AN EMPTY STOMACH 90 Tablet 3    albuterol 108 (90 Base) MCG/ACT Aero Soln inhalation aerosol Inhale 1-2 Puffs every four hours as needed for Shortness of Breath. 1 Each 3    Multiple Vitamins-Minerals (CENTRUM ULTRA WOMENS PO) Take  by mouth.      Cinnamon 500 MG Tab Take  by mouth.      Calcium 200 MG Tab Take  by mouth.      Cholecalciferol (VITAMIN D) 50 MCG (2000 UT) Cap Take  by mouth.      coenzyme Q-10 30 MG capsule Take 60 mg by mouth every day.       No current facility-administered medications for this visit.   [2]   Social History  Tobacco Use    Smoking status: Never    Smokeless tobacco: Never   Vaping Use    Vaping status: Never Used   Substance Use Topics    Alcohol use: Yes     Alcohol/week: 8.4 oz     Types: 14 Shots of liquor per week     Comment: occ    Drug use: No   [3]   Past Surgical History:  Procedure Laterality Date    EYE SURGERY      bilateral upper eyelids     NASAL POLYPECTOMY      PRIMARY C SECTION